# Patient Record
Sex: MALE | Employment: FULL TIME | URBAN - METROPOLITAN AREA
[De-identification: names, ages, dates, MRNs, and addresses within clinical notes are randomized per-mention and may not be internally consistent; named-entity substitution may affect disease eponyms.]

---

## 2018-01-24 ENCOUNTER — APPOINTMENT (OUTPATIENT)
Dept: GENERAL RADIOLOGY | Age: 23
End: 2018-01-24
Attending: PHYSICIAN ASSISTANT
Payer: COMMERCIAL

## 2018-01-24 ENCOUNTER — HOSPITAL ENCOUNTER (EMERGENCY)
Age: 23
Discharge: HOME | End: 2018-01-25
Attending: EMERGENCY MEDICINE
Payer: COMMERCIAL

## 2018-01-24 VITALS
OXYGEN SATURATION: 99 % | DIASTOLIC BLOOD PRESSURE: 81 MMHG | RESPIRATION RATE: 16 BRPM | WEIGHT: 124.12 LBS | HEIGHT: 65 IN | SYSTOLIC BLOOD PRESSURE: 134 MMHG | TEMPERATURE: 98.7 F | HEART RATE: 99 BPM | BODY MASS INDEX: 20.68 KG/M2

## 2018-01-24 DIAGNOSIS — S62.632A CLOSED DISPLACED FRACTURE OF DISTAL PHALANX OF RIGHT MIDDLE FINGER, INITIAL ENCOUNTER: ICD-10-CM

## 2018-01-24 DIAGNOSIS — S62.630A CLOSED DISPLACED FRACTURE OF DISTAL PHALANX OF RIGHT INDEX FINGER, INITIAL ENCOUNTER: Primary | ICD-10-CM

## 2018-01-24 PROCEDURE — 74011250637 HC RX REV CODE- 250/637: Performed by: PHYSICIAN ASSISTANT

## 2018-01-24 PROCEDURE — 99283 EMERGENCY DEPT VISIT LOW MDM: CPT

## 2018-01-24 PROCEDURE — 73130 X-RAY EXAM OF HAND: CPT

## 2018-01-24 RX ORDER — NAPROXEN 500 MG/1
500 TABLET ORAL
Qty: 20 TAB | Refills: 0 | Status: SHIPPED | OUTPATIENT
Start: 2018-01-24 | End: 2018-01-25

## 2018-01-24 RX ORDER — HYDROCODONE BITARTRATE AND ACETAMINOPHEN 5; 325 MG/1; MG/1
1 TABLET ORAL
Qty: 20 TAB | Refills: 0 | Status: SHIPPED | OUTPATIENT
Start: 2018-01-24 | End: 2020-01-24

## 2018-01-24 RX ORDER — HYDROCODONE BITARTRATE AND ACETAMINOPHEN 5; 325 MG/1; MG/1
1 TABLET ORAL
Status: COMPLETED | OUTPATIENT
Start: 2018-01-24 | End: 2018-01-24

## 2018-01-24 RX ORDER — NAPROXEN 250 MG/1
500 TABLET ORAL 2 TIMES DAILY WITH MEALS
Status: DISCONTINUED | OUTPATIENT
Start: 2018-01-25 | End: 2018-01-24

## 2018-01-24 RX ORDER — NAPROXEN 250 MG/1
500 TABLET ORAL
Status: COMPLETED | OUTPATIENT
Start: 2018-01-24 | End: 2018-01-24

## 2018-01-24 RX ADMIN — HYDROCODONE BITARTRATE AND ACETAMINOPHEN 1 TABLET: 5; 325 TABLET ORAL at 21:51

## 2018-01-24 RX ADMIN — NAPROXEN 500 MG: 250 TABLET ORAL at 21:50

## 2018-01-24 NOTE — LETTER
[address] 
[facility] EMERGENCY DEPT 
[address] [place] [address] [address] [id number] [phone] Work/School Note Date: 1/24/2018 - 1/25/2018 To Whom It May concern: 
 
Román Carroll was seen and treated today in the emergency room by the following provider(s): 
Attending Provider: Cherylene Beauvais, MD 
Physician Assistant: JAMIA Mendoza. [facility] may return to work on 1/28/18 or sooner, if feeling better. Sincerely, JAMIA Mendoza

## 2018-01-25 PROCEDURE — 77030008331

## 2018-01-25 RX ORDER — NAPROXEN 500 MG/1
500 TABLET ORAL
Qty: 20 TAB | Refills: 0 | Status: SHIPPED | OUTPATIENT
Start: 2018-01-25 | End: 2020-01-28

## 2018-01-25 NOTE — ED NOTES
Pt states \"smashing\" R hand on concrete well-states swelling, bruising and main mostly to 2nd and 3rd digits R hand.

## 2018-01-25 NOTE — DISCHARGE INSTRUCTIONS
Finger Fracture: Care Instructions  Your Care Instructions    Breaks in the bones of the finger usually heal well in about 3 to 4 weeks. The pain and swelling from a broken finger can last for weeks. But it should steadily improve, starting a few days after you break it. It is very important that you wear and take care of the cast or splint exactly as your doctor tells you to so that your finger heals properly and does not end up crooked. Wearing a splint may interfere with your normal activities. Ask for help with daily tasks if you need it. You heal best when you take good care of yourself. Eat a variety of healthy foods, and don't smoke. Follow-up care is a key part of your treatment and safety. Be sure to make and go to all appointments, and call your doctor if you are having problems. It's also a good idea to know your test results and keep a list of the medicines you take. How can you care for yourself at home? · If your doctor put a splint on your finger, wear the splint exactly as directed. Do not remove it until your doctor says that you can. · Keep your hand raised above the level of your heart as much as you can. This will help reduce swelling. · Put ice or a cold pack on your finger for 10 to 20 minutes at a time. Try to do this every 1 to 2 hours for the next 3 days (when you are awake) or until the swelling goes down. Put a thin cloth between the ice and your skin. Keep the splint dry. · Be safe with medicines. Take pain medicines exactly as directed. ¨ If the doctor gave you a prescription medicine for pain, take it as prescribed. ¨ If you are not taking a prescription pain medicine, ask your doctor if you can take an over-the-counter medicine. When should you call for help? Call 911 anytime you think you may need emergency care. For example, call if:  ? · Your finger is cool or pale or changes color.    ?Call your doctor now or seek immediate medical care if:  ? · Your pain gets much worse.   ? · You have tingling, weakness, or numbness in your finger. ? · You have signs of infection, such as:  ¨ Increased pain, swelling, warmth, or redness. ¨ Red streaks leading from the area. ¨ Pus draining from the area. ¨ Swollen lymph nodes in your neck, armpits, or groin. ¨ A fever. ? Watch closely for changes in your health, and be sure to contact your doctor if:  ? · Your finger is not steadily improving. Where can you learn more? Go to http://neeraj-enid.info/. Enter P761 in the search box to learn more about \"Finger Fracture: Care Instructions. \"  Current as of: March 21, 2017  Content Version: 11.4  © 7964-7709 [employer]. Care instructions adapted under license by [employer] (which disclaims liability or warranty for this information). If you have questions about a medical condition or this instruction, always ask your healthcare professional. [place] [address] any warranty or liability for your use of this information. Finger Fracture: Rehab Exercises  Your Care Instructions  Here are some examples of typical rehabilitation exercises for your condition. Start each exercise slowly. Ease off the exercise if you start to have pain. Your doctor or your physical or occupational therapist will tell you when you can start these exercises and which ones will work best for you. How to do the exercises  Finger extension    1. Place your hand flat on a table, palm down. 2. Lift and then lower your affected finger off the table. 3. Repeat 8 to 12 times. MP extension    1. Place your good hand on a table, palm up. Put your hand with the affected finger on top of your good hand with your fingers wrapped around the thumb of your good hand like you are making a fist.  2. Slowly uncurl the joints of your hand with the affected finger where your fingers connect to your hand so that only the top two joints of your fingers are bent. Your fingers will look like a hook. 3. Move back to your starting position, with your fingers wrapped around your good thumb. 4. Repeat 8 to 12 times. DIP flexion    1. With your good hand, grasp your affected finger. Your thumb will be on the top side of your finger just below the joint that is closest to your fingernail. 2. Slowly bend your affected finger only at the joint closest to your fingernail. Hold for about 6 seconds. 3. Repeat 8 to 12 times. PIP extension (with MP extension)    1. Place your good hand on a table, palm up. Put your hand with the affected finger on top of your good hand. 2. Use the thumb and fingers of your good hand to grasp below the middle joint of your affected finger. 3. Bend and then straighten the last two joints of your affected finger. 4. Repeat 8 to 12 times. Isolated PIP flexion    1. Place the hand with the affected finger flat on a table, palm up. With your other hand, press down on the fingers that are not affected. Your affected finger will be free to move. 2. Slowly bend your affected finger. Hold for about 6 seconds. Then straighten your finger. 3. Repeat 8 to 12 times. Imaginary ball squeeze    1. Pretend to hold an imaginary ball. 2. Slowly bend your fingers around the imaginary ball, and squeeze the \"ball\" for about 6 seconds. Then slowly straighten your fingers to release the \"ball. \"  3. Repeat 8 to 12 times. Tendon glides    1. In this exercise, the steps follow one another to a make a continuous movement. 2. Hold your hand upward. Your fingers and thumb will be pointing straight up. Your wrist should be relaxed, following the line of your fingers and thumb. 3. Curl your fingers so that the top two joints in them are bent, and your fingers wrap down. Your fingertips should touch or be near the base of your fingers. Your fingers will look like a hook. 4. Make a fist by bending your knuckles.  Your thumb can gently rest against your index (pointing) finger. 5. Unwind your fingers slightly so that your fingertips can touch the base of your palm. Your thumb can rest against your index finger. 6. Move back to your starting position, with your fingers and thumb pointing up. 7. Repeat the series of motions 8 to 12 times. Towel squeeze    1. Place a small towel roll on a table. 2. With your palm facing down, grab the towel and squeeze it for about 6 seconds. Then slowly straighten your fingers to release the towel. 3. Repeat 8 to 12 times. Towel grab    1. Fold a small towel in half, and lay it flat on a table. 2. Put your hand flat on the towel, palm down. Grab the towel, and scrunch it toward you until your hand is in a fist.  3. Slowly straighten your fingers to push the towel back so it is flat on the table again. 4. Repeat 8 to 12 times. Follow-up care is a key part of your treatment and safety. Be sure to make and go to all appointments, and call your doctor if you are having problems. It's also a good idea to know your test results and keep a list of the medicines you take. Where can you learn more? Go to http://neeraj-enid.info/. Enter V403 in the search box to learn more about \"Finger Fracture: Rehab Exercises. \"  Current as of: March 21, 2017  Content Version: 11.4  © 2807-3768 [employer], [employer]. Care instructions adapted under license by [employer] (which disclaims liability or warranty for this information). If you have questions about a medical condition or this instruction, always ask your healthcare professional. [address] any warranty or liability for your use of this information.

## 2018-01-25 NOTE — ED NOTES
Discharge instructions given to patient by PA Car. Verbalized understanding of instructions. Patient discharged without difficulty. Patient discharged in stable condition via ambulation accompanied by spouse.

## 2018-01-25 NOTE — ED PROVIDER NOTES
EMERGENCY DEPARTMENT HISTORY AND PHYSICAL EXAM      Date: 1/24/2018  Patient Name: Garrett Shepard Page    History of Presenting Illness     Chief Complaint   Patient presents with    Hand Pain     Ambulatory to triage. A concrete brick close to 100 lbs fell on the tips of his fingers on the right hand. This happened about an hour ago. History Provided By: Patient    HPI: Moisés Gibson is a 20 y.o. R handed male who presents ambulatory to the ED c/o sudden onset 8/10 R hand pain s/p crush injury x 2100 this evening. Pt states his hand was caught under an ~100lb concrete brick. He reports additional swelling and bruising to his R 2nd and 3rd distal finger tips. Pt states he hasn't been evaluated by an orthopedic surgeon in years. He denies any recent medications for his current sxs. Pt denies any known hx of long standing diseases or daily medications. He otherwise specifically denies any recent fevers, chills, nausea, vomiting, diarrhea, abd pain, CP, SOB, urinary sxs, changes in BM, or headache. PCP: None    Allergies: NKDA  Social Hx: -tobacco, -EtOH, -Illicit Drugs    There are no other complaints, changes, or physical findings at this time. Past History     Past Medical History:  History reviewed. No pertinent past medical history. Past Surgical History:  History reviewed. No pertinent surgical history. Family History:  History reviewed. No pertinent family history. Social History:  Social History   Substance Use Topics    Smoking status: Never Smoker    Smokeless tobacco: Never Used    Alcohol use None       Allergies:  No Known Allergies      Review of Systems   Review of Systems   Constitutional: Negative. Negative for fever. HENT: Negative. Eyes: Negative. Respiratory: Negative. Cardiovascular: Negative. Gastrointestinal: Negative. Negative for constipation, diarrhea, nausea and vomiting.         Denies liver disease   Endocrine:        Denies thyroid disease Genitourinary: Negative. Negative for dysuria. Denies kidney disease   Musculoskeletal: Positive for arthralgias (R hand 2nd and 3rd fingers). Skin: Negative. Neurological: Negative. All other systems reviewed and are negative. Physical Exam   Physical Exam   Constitutional: He is oriented to person, place, and time. He appears well-developed and well-nourished. No distress. HENT:   Head: Normocephalic and atraumatic. Right Ear: External ear normal.   Left Ear: External ear normal.   Nose: Nose normal.   Eyes: Conjunctivae and EOM are normal. Pupils are equal, round, and reactive to light. Right eye exhibits no discharge. Left eye exhibits no discharge. No scleral icterus. Neck: Normal range of motion. Neck supple. No tracheal deviation present. Cardiovascular: Normal rate, regular rhythm, normal heart sounds and intact distal pulses. Exam reveals no gallop and no friction rub. No murmur heard. Pulmonary/Chest: Effort normal and breath sounds normal. No respiratory distress. He has no wheezes. He has no rales. He exhibits no tenderness. Musculoskeletal: He exhibits no edema or tenderness. Contusion to the du aspect distal phalanx of the R 2nd and 3rd fingers  Small subungual hematomas in the nail beds of both fingers  Flexion extension in tact  Sensations intact  Capillary refill less than 3 seconds   Lymphadenopathy:     He has no cervical adenopathy. Neurological: He is alert and oriented to person, place, and time. No cranial nerve deficit. Coordination normal.   Skin: Skin is warm and dry. No rash noted. No erythema. Psychiatric: He has a normal mood and affect. His behavior is normal. Judgment and thought content normal.   Nursing note and vitals reviewed. Diagnostic Study Results       Radiologic Studies -     XR HAND RT MIN 3 V   Final Result     EXAM:  XR HAND RT MIN 3 V     INDICATION:  injury.   Crush injury to right hand.     COMPARISON: None.     FINDINGS: Three views of the right hand demonstrate mildly displaced fractures  involving the ro of the second and third distal phalanges. No other acute  fracture or dislocation is evident. The soft tissues are within normal limits.     IMPRESSION  IMPRESSION:  Mildly displaced second and third distal phalanx fractures. Medical Decision Making   I am the first provider for this patient. I reviewed the vital signs, available nursing notes, past medical history, past surgical history, family history and social history. Vital Signs-Reviewed the patient's vital signs. Patient Vitals for the past 12 hrs:   Temp Pulse Resp BP SpO2   01/24/18 2152 98.7 °F (37.1 °C) 99 16 134/81 99 %       Records Reviewed: Nursing Notes and Old Medical Records    Provider Notes (Medical Decision Making):     DDx: sprain, strain, fracture, contusion, crush injury, subungual hematoma    ED Course:   Initial assessment performed. The patients presenting problems have been discussed, and they are in agreement with the care plan formulated and outlined with them. I have encouraged them to ask questions as they arise throughout their visit. Procedure Note - Splint Placement:  11:51 PM  Performed by: Warnell Duverney  Neurovascularly intact prior to tx. A plastic STAXX finger splint was placed on pt's right 2nd and 3rd fingers. Joint was placed in neutral position. Neurovascularly intact after tx. The procedure took 1-15 minutes, and pt tolerated well. Progress note:  12:01 AM  Pt noted to be feeling better following Norco and Naproxen, ready for discharge. Updated pt and/or family on all final imaging findings. Will follow up as instructed. All questions have been answered, pt voiced understanding and agreement with plan. Narcotics were prescribed, pt was advised not to drive or operate heavy machinery.  Specific return precautions provided as well as instructions to return to the ED should sx worsen at any time. Vital signs stable for discharge. Written by Kareen Gunderson ED Scribe, as dictated by Alanna Quarles    Disposition:    DISCHARGE NOTE:  12:01 AM  The patient's results have been reviewed with family and/or caregiver. They verbally convey their understanding and agreement of the patient's signs, symptoms, diagnosis, treatment, and prognosis. They additionally agree to follow up as recommended in the discharge instructions or to return to the Emergency Room should the patient's condition change prior to their follow-up appointment. The family and/or caregiver verbally agrees with the care-plan and all of their questions have been answered. The discharge instructions have also been provided to the them along with educational information regarding the patient's diagnosis and a list of reasons why the patient would want to return to the ER prior to their follow-up appointment should their condition change. Written by CASSIE Lovelaceibe, as dictated by Alanna Quarles. PLAN:  1. Current Discharge Medication List      START taking these medications    Details   HYDROcodone-acetaminophen (NORCO) 5-325 mg per tablet Take 1 Tab by mouth every six (6) hours as needed for Pain. Max Daily Amount: 4 Tabs. Qty: 20 Tab, Refills: 0    Associated Diagnoses: Closed displaced fracture of distal phalanx of right index finger, initial encounter; Closed displaced fracture of distal phalanx of right middle finger, initial encounter      naproxen (NAPROSYN) 500 mg tablet Take 1 Tab by mouth every twelve (12) hours as needed for Pain. Qty: 20 Tab, Refills: 0           2.    Follow-up Information     Follow up With Details Comments 1227 Den Yoon MD  hand surgeon Xander [address]  [facility]  [phone]      [facility] EMERGENCY DEPT  If symptoms worsen [address]  [address] [facility]  [phone]        Return to ED if worse     Diagnosis Clinical Impression:   1. Closed displaced fracture of distal phalanx of right index finger, initial encounter    2. Closed displaced fracture of distal phalanx of right middle finger, initial encounter        Attestations: This note is prepared by Federica Mcgee, acting as Scribe for ANGEL Lay Favors: The scribe's documentation has been prepared under my direction and personally reviewed by me in its entirety. I confirm that the note above accurately reflects all work, treatment, procedures, and medical decision making performed by me. This note will not be viewable in [address]

## 2020-01-22 ENCOUNTER — HOSPITAL ENCOUNTER (EMERGENCY)
Age: 25
Discharge: HOME | End: 2020-01-22
Attending: EMERGENCY MEDICINE
Payer: COMMERCIAL

## 2020-01-22 ENCOUNTER — APPOINTMENT (OUTPATIENT)
Dept: CT IMAGING | Age: 25
End: 2020-01-22
Attending: EMERGENCY MEDICINE
Payer: COMMERCIAL

## 2020-01-22 VITALS
HEIGHT: 70 IN | OXYGEN SATURATION: 96 % | HEART RATE: 108 BPM | WEIGHT: 156.53 LBS | SYSTOLIC BLOOD PRESSURE: 112 MMHG | DIASTOLIC BLOOD PRESSURE: 61 MMHG | RESPIRATION RATE: 18 BRPM | BODY MASS INDEX: 22.41 KG/M2 | TEMPERATURE: 99.9 F

## 2020-01-22 DIAGNOSIS — R50.9 FEVER, UNSPECIFIED FEVER CAUSE: ICD-10-CM

## 2020-01-22 DIAGNOSIS — R51.9 NONINTRACTABLE HEADACHE, UNSPECIFIED CHRONICITY PATTERN, UNSPECIFIED HEADACHE TYPE: Primary | ICD-10-CM

## 2020-01-22 LAB
ALBUMIN SERPL-MCNC: 3.8 G/DL (ref 3.5–5)
ALBUMIN/GLOB SERPL: 0.8 {RATIO} (ref 1.1–2.2)
ALP SERPL-CCNC: 80 U/L (ref 45–117)
ALT SERPL-CCNC: 44 U/L (ref 12–78)
ANION GAP SERPL CALC-SCNC: 7 MMOL/L (ref 5–15)
APPEARANCE CSF: CLEAR
AST SERPL-CCNC: 29 U/L (ref 15–37)
BASOPHILS # BLD: 0.1 K/UL (ref 0–0.1)
BASOPHILS NFR BLD: 1 % (ref 0–1)
BILIRUB SERPL-MCNC: 0.4 MG/DL (ref 0.2–1)
BUN SERPL-MCNC: 16 MG/DL (ref 6–20)
BUN/CREAT SERPL: 15 (ref 12–20)
CALCIUM SERPL-MCNC: 8.9 MG/DL (ref 8.5–10.1)
CHLORIDE SERPL-SCNC: 102 MMOL/L (ref 97–108)
CO2 SERPL-SCNC: 26 MMOL/L (ref 21–32)
COLOR CSF: COLORLESS
CREAT SERPL-MCNC: 1.07 MG/DL (ref 0.7–1.3)
DIFFERENTIAL METHOD BLD: NORMAL
EOSINOPHIL # BLD: 0.2 K/UL (ref 0–0.4)
EOSINOPHIL NFR BLD: 2 % (ref 0–7)
ERYTHROCYTE [DISTWIDTH] IN BLOOD BY AUTOMATED COUNT: 12.5 % (ref 11.5–14.5)
GLOBULIN SER CALC-MCNC: 4.6 G/DL (ref 2–4)
GLUCOSE CSF-MCNC: 48 MG/DL (ref 40–70)
GLUCOSE SERPL-MCNC: 84 MG/DL (ref 65–100)
HCT VFR BLD AUTO: 43.6 % (ref 36.6–50.3)
HGB BLD-MCNC: 15.3 G/DL (ref 12.1–17)
IMM GRANULOCYTES # BLD AUTO: 0 K/UL (ref 0–0.04)
IMM GRANULOCYTES NFR BLD AUTO: 0 % (ref 0–0.5)
LYMPHOCYTES # BLD: 3.1 K/UL (ref 0.8–3.5)
LYMPHOCYTES NFR BLD: 31 % (ref 12–49)
MCH RBC QN AUTO: 28.9 PG (ref 26–34)
MCHC RBC AUTO-ENTMCNC: 35.1 G/DL (ref 30–36.5)
MCV RBC AUTO: 82.3 FL (ref 80–99)
MONOCYTES # BLD: 0.8 K/UL (ref 0–1)
MONOCYTES NFR BLD: 8 % (ref 5–13)
NEUTS SEG # BLD: 6 K/UL (ref 1.8–8)
NEUTS SEG NFR BLD: 58 % (ref 32–75)
NRBC # BLD: 0 K/UL (ref 0–0.01)
NRBC BLD-RTO: 0 PER 100 WBC
PLATELET # BLD AUTO: 193 K/UL (ref 150–400)
PMV BLD AUTO: 9.8 FL (ref 8.9–12.9)
POTASSIUM SERPL-SCNC: 4 MMOL/L (ref 3.5–5.1)
PROT CSF-MCNC: 37 MG/DL (ref 15–45)
PROT SERPL-MCNC: 8.4 G/DL (ref 6.4–8.2)
RBC # BLD AUTO: 5.3 M/UL (ref 4.1–5.7)
RBC # CSF: 135 /CU MM
SODIUM SERPL-SCNC: 135 MMOL/L (ref 136–145)
TUBE # CSF: 1
TUBE # CSF: 1
TUBE # CSF: 3
WBC # BLD AUTO: 10.2 K/UL (ref 4.1–11.1)
WBC # CSF: 0 /CU MM (ref 0–5)

## 2020-01-22 PROCEDURE — 75810000133 HC LUMBAR PUNCTURE

## 2020-01-22 PROCEDURE — 70450 CT HEAD/BRAIN W/O DYE: CPT

## 2020-01-22 PROCEDURE — 89050 BODY FLUID CELL COUNT: CPT

## 2020-01-22 PROCEDURE — 80053 COMPREHEN METABOLIC PANEL: CPT

## 2020-01-22 PROCEDURE — 99283 EMERGENCY DEPT VISIT LOW MDM: CPT

## 2020-01-22 PROCEDURE — 96375 TX/PRO/DX INJ NEW DRUG ADDON: CPT

## 2020-01-22 PROCEDURE — 74011000250 HC RX REV CODE- 250: Performed by: EMERGENCY MEDICINE

## 2020-01-22 PROCEDURE — 74011250637 HC RX REV CODE- 250/637: Performed by: EMERGENCY MEDICINE

## 2020-01-22 PROCEDURE — 74011250636 HC RX REV CODE- 250/636: Performed by: EMERGENCY MEDICINE

## 2020-01-22 PROCEDURE — 85025 COMPLETE CBC W/AUTO DIFF WBC: CPT

## 2020-01-22 PROCEDURE — 36415 COLL VENOUS BLD VENIPUNCTURE: CPT

## 2020-01-22 PROCEDURE — 87205 SMEAR GRAM STAIN: CPT

## 2020-01-22 PROCEDURE — 82945 GLUCOSE OTHER FLUID: CPT

## 2020-01-22 PROCEDURE — 84157 ASSAY OF PROTEIN OTHER: CPT

## 2020-01-22 PROCEDURE — 96374 THER/PROPH/DIAG INJ IV PUSH: CPT

## 2020-01-22 PROCEDURE — 87015 SPECIMEN INFECT AGNT CONCNTJ: CPT

## 2020-01-22 RX ORDER — DIPHENHYDRAMINE HYDROCHLORIDE 50 MG/ML
25 INJECTION, SOLUTION INTRAMUSCULAR; INTRAVENOUS
Status: COMPLETED | OUTPATIENT
Start: 2020-01-22 | End: 2020-01-22

## 2020-01-22 RX ORDER — ACETAMINOPHEN 500 MG
1000 TABLET ORAL ONCE
Status: COMPLETED | OUTPATIENT
Start: 2020-01-22 | End: 2020-01-22

## 2020-01-22 RX ADMIN — PROCHLORPERAZINE EDISYLATE 10 MG: 5 INJECTION INTRAMUSCULAR; INTRAVENOUS at 15:42

## 2020-01-22 RX ADMIN — DIPHENHYDRAMINE HYDROCHLORIDE 25 MG: 50 INJECTION, SOLUTION INTRAMUSCULAR; INTRAVENOUS at 15:43

## 2020-01-22 RX ADMIN — SODIUM CHLORIDE 1000 ML: 900 INJECTION, SOLUTION INTRAVENOUS at 15:40

## 2020-01-22 RX ADMIN — ACETAMINOPHEN 1000 MG: 500 TABLET ORAL at 15:32

## 2020-01-22 NOTE — ED PROVIDER NOTES
EMERGENCY DEPARTMENT HISTORY AND PHYSICAL EXAM      Date: 1/22/2020  Patient Name: Mary Jo Prieto  Patient Age and Sex: 21 y.o. male     History of Presenting Illness     Chief Complaint   Patient presents with    Headache     onset 1/19 with fever; sent by [facility] for r/o meningitis. - flu and unremarkable labs. Appears non toxic       History Provided By: Patient     HPI: Bhargav Mills Is a 76-year-old male with past medical history of Crohn's disease on Humira presenting today with fever and headache. The patient reports around 3 days ago he started having severe headache. He describes this as all over the back of his head without radiation. He had his headache somewhat subsided then he was woken up from sleep again with severe headache. The patient states that he had nausea, but no vomiting. He denies any other associated symptoms he has no cough, congestion, sore throat, abdominal pain, rash. He is not on steroids, but does take Humira. He was seen at urgent care and then was transitioned to the emergency department for further evaluation. Negative influenza test at urgent care. There are no other complaints, changes, or physical findings at this time. PCP: None    No current facility-administered medications on file prior to encounter. Current Outpatient Medications on File Prior to Encounter   Medication Sig Dispense Refill    naproxen (NAPROSYN) 500 mg tablet Take 1 Tab by mouth every twelve (12) hours as needed for Pain. 20 Tab 0    HYDROcodone-acetaminophen (NORCO) 5-325 mg per tablet Take 1 Tab by mouth every six (6) hours as needed for Pain. Max Daily Amount: 4 Tabs. 20 Tab 0       Past History     Past Medical History:  No past medical history on file. Past Surgical History:  No past surgical history on file. Family History:  No family history on file.     Social History:  Social History     Tobacco Use    Smoking status: Never Smoker    Smokeless tobacco: Never Used Substance Use Topics    Alcohol use: Not on file    Drug use: Not on file       Allergies:  No Known Allergies      Review of Systems   Constitutional: +  fever,  +  headache  Skin: No  rash, No  jaundice  HEENT: No  nasal congestion, No  eye drainage. Resp: No cough,  No  wheezing  CV: No chest pain, No  palpitations  GI: No vomiting,  No  diarrhea.,  No  constipation  : No dysuria,  No  hematuria  MSK: No joint pain,  No  trauma  Neuro: No numbness, No  tingling  Psych: No suicidal, No  paranoid      Physical Exam     Patient Vitals for the past 12 hrs:   Temp Pulse Resp BP SpO2   01/22/20 1800    112/61 96 %   01/22/20 1545    121/80    01/22/20 1350 99.9 °F (37.7 °C) (!) 108 18 (!) 148/95 100 %     General: alert, No acute distress  Eyes: EOMI, normal conjunctiva  ENT: moist mucous membranes. Neck: Active, full ROM of neck. No stiffness  Skin: No rashes. no jaundice              Lungs: Equal chest expansion. no respiratory distress. clear to auscultation bilaterally No accessory muscle usage  Heart: tachycardic with a  regular rhythm     no peripheral edema   2+ radial pulses and DPs bilaterally  Abd:  non distended soft, nontender. No rebound tenderness. No guarding  Back: Full ROM  MSK: Full, active ROM in all 4 extremities.    Neuro: Person, Place, Time and Situation; normal speech;   Psych: Cooperative with exam; Appropriate mood and affect             Diagnostic Study Results     Labs -     Recent Results (from the past 12 hour(s))   METABOLIC PANEL, COMPREHENSIVE    Collection Time: 01/22/20  3:49 PM   Result Value Ref Range    Sodium 135 (L) 136 - 145 mmol/L    Potassium 4.0 3.5 - 5.1 mmol/L    Chloride 102 97 - 108 mmol/L    CO2 26 21 - 32 mmol/L    Anion gap 7 5 - 15 mmol/L    Glucose 84 65 - 100 mg/dL    BUN 16 6 - 20 MG/DL    Creatinine 1.07 0.70 - 1.30 MG/DL    BUN/Creatinine ratio 15 12 - 20      GFR est AA >60 >60 ml/min/1.73m2    GFR est non-AA >60 >60 ml/min/1.73m2    Calcium 8.9 8.5 - 10.1 MG/DL    Bilirubin, total 0.4 0.2 - 1.0 MG/DL    ALT (SGPT) 44 12 - 78 U/L    AST (SGOT) 29 15 - 37 U/L    Alk. phosphatase 80 45 - 117 U/L    Protein, total 8.4 (H) 6.4 - 8.2 g/dL    Albumin 3.8 3.5 - 5.0 g/dL    Globulin 4.6 (H) 2.0 - 4.0 g/dL    A-G Ratio 0.8 (L) 1.1 - 2.2     CBC WITH AUTOMATED DIFF    Collection Time: 01/22/20  3:49 PM   Result Value Ref Range    WBC 10.2 4.1 - 11.1 K/uL    RBC 5.30 4. 10 - 5.70 M/uL    HGB 15.3 12.1 - 17.0 g/dL    HCT 43.6 36.6 - 50.3 %    MCV 82.3 80.0 - 99.0 FL    MCH 28.9 26.0 - 34.0 PG    MCHC 35.1 30.0 - 36.5 g/dL    RDW 12.5 11.5 - 14.5 %    PLATELET 947 840 - 344 K/uL    MPV 9.8 8.9 - 12.9 FL    NRBC 0.0 0  WBC    ABSOLUTE NRBC 0.00 0.00 - 0.01 K/uL    NEUTROPHILS 58 32 - 75 %    LYMPHOCYTES 31 12 - 49 %    MONOCYTES 8 5 - 13 %    EOSINOPHILS 2 0 - 7 %    BASOPHILS 1 0 - 1 %    IMMATURE GRANULOCYTES 0 0.0 - 0.5 %    ABS. NEUTROPHILS 6.0 1.8 - 8.0 K/UL    ABS. LYMPHOCYTES 3.1 0.8 - 3.5 K/UL    ABS. MONOCYTES 0.8 0.0 - 1.0 K/UL    ABS. EOSINOPHILS 0.2 0.0 - 0.4 K/UL    ABS. BASOPHILS 0.1 0.0 - 0.1 K/UL    ABS. IMM. GRANS. 0.0 0.00 - 0.04 K/UL    DF AUTOMATED     PROTEIN, CSF    Collection Time: 01/22/20  5:30 PM   Result Value Ref Range    Tube No. 1      Protein,CSF 37 15 - 45 MG/DL   GLUCOSE, CSF    Collection Time: 01/22/20  5:30 PM   Result Value Ref Range    Tube No. 1      Glucose,CSF 48 40 - 70 MG/DL   CULTURE, CSF W GRAM STAIN    Collection Time: 01/22/20  5:30 PM   Result Value Ref Range    Special Requests: NO SPECIAL REQUESTS      GRAM STAIN RARE WBCS SEEN      GRAM STAIN NO ORGANISMS SEEN      Culture result: PENDING    CELL COUNT, CSF    Collection Time: 01/22/20  5:30 PM   Result Value Ref Range    CSF TUBE NO. 3      CSF COLOR COLORLESS COL      CSF APPEARANCE CLEAR CLEAR      CSF RBCS 135 (H) 0 /cu mm    CSF WBCS 0 0 - 5 /cu mm       Radiologic Studies -   CT HEAD WO CONT   Final Result   IMPRESSION: No acute intracranial process. CT Results  (Last 48 hours)               01/22/20 1523  CT HEAD WO CONT Final result    Impression:  IMPRESSION: No acute intracranial process. Narrative:  EXAM: CT HEAD WO CONT       INDICATION: severe headache       COMPARISON: None. CONTRAST: None. TECHNIQUE: Unenhanced CT of the head was performed using 5 mm images. Brain and   bone windows were generated. CT dose reduction was achieved through use of a   standardized protocol tailored for this examination and automatic exposure   control for dose modulation. FINDINGS:   The ventricles and sulci are normal in size, shape and configuration and   midline. There is no significant white matter disease. There is no intracranial   hemorrhage, extra-axial collection, mass, mass effect or midline shift. The   basilar cisterns are open. No acute infarct is identified. The bone windows   demonstrate no abnormalities. The visualized portions of the paranasal sinuses   and mastoid air cells are clear. CXR Results  (Last 48 hours)    None            Medical Decision Making     Differential Diagnosis: Viral meningitis, viral URI, tension headache, dehydration    I reviewed the vital signs, available nursing notes, past medical history, past surgical history, family history and social history and old medical records. On my interpretation, Laboratory workup is significant for sodium is 135, otherwise unremarkable electrolytes, no elevation white blood cell count, CSF has normal protein, normal glucose, slightly elevated red blood cell counts in the last 2, but type was slightly traumatic as your white blood cells in the CSF  On my interpretation of the radiology studies CT head is negative for acute intracranial process    Management/ED course: Patient presented with headache and fever. He is on Humira for Crohn's disease. He had no other evidence of infection, and no symptoms to suggest alternate viral illness.   I performed a lumbar puncture. The only abnormality was some red blood cells in the CSF, but the tap was slightly traumatic. No xanthochromia. Patient had a negative CT scan. No white blood cells in the CSF. Nonetheless this has been sent for Gram stain and culture no organisms were seen and cultures pending. The patient had improvement in his headache after a migraine cocktail. And the patient is subsequently discharged follow-up with primary care provider. ED Course:   Initial assessment performed. The patients presenting problems have been discussed, and they are in agreement with the care plan formulated and outlined with them. I have encouraged them to ask questions as they arise throughout their visit. Procedures:  PROCEDURE:  Lumbar Puncture    Performed by: Skuhi Mares MD    Indication: Headache  Universal Protocol: a time out was performed and the correct patient was verified  Consent: Written consent obtained    The patient was placed in lateral recumbent position and was prepped and draped in proper sterile fashion. The spinous process interspace at the level of the superior iliac spine was anesthetized with 5 ml of 1% lidocaine. A 18 gauge spinal needle was used to obtain 20  milliliters of fluid. The opening pressure was 20. The fluid appeared clear and was sent to the lab for appropriate studies. The patient tolerated the procedure well without difficulty or complication. Post-Procedure Diagnosis: Same as indication  Complications: None  Estimated Blood Loss: Minimal  Specimens Removed: As documented above  Prosthetic devices/implants: No  Assistant(s): None        Dispo: Discharged. The patient has been re-evaluated and is ready for discharge. Reviewed available results with patient. Counseled patient on diagnosis and care plan. Patient has expressed understanding, and all questions have been answered.  Patient agrees with plan and agrees to follow up as recommended, or to return to the ED if their symptoms worsen. Discharge instructions have been provided and explained to the patient, along with reasons to return to the ED. PLAN:  Discharge Medication List as of 1/22/2020  6:39 PM        2. Follow-up Information    None       3. Return to ED if worse         Diagnosis     Clinical Impression:   1. Nonintractable headache, unspecified chronicity pattern, unspecified headache type    2. Fever, unspecified fever cause        Attestations:  Santos Meyers MD        Please note that this dictation was completed with [employer], the computer voice recognition software. Quite often unanticipated grammatical, syntax, homophones, and other interpretive errors are inadvertently transcribed by the computer software. Please disregard these errors. Please excuse any errors that have escaped final proofreading. Thank you.

## 2020-01-24 ENCOUNTER — APPOINTMENT (OUTPATIENT)
Dept: GENERAL RADIOLOGY | Age: 25
End: 2020-01-24
Attending: EMERGENCY MEDICINE
Payer: COMMERCIAL

## 2020-01-24 ENCOUNTER — HOSPITAL ENCOUNTER (EMERGENCY)
Age: 25
Discharge: HOME | End: 2020-01-25
Attending: EMERGENCY MEDICINE
Payer: COMMERCIAL

## 2020-01-24 ENCOUNTER — APPOINTMENT (OUTPATIENT)
Dept: CT IMAGING | Age: 25
End: 2020-01-24
Attending: EMERGENCY MEDICINE
Payer: COMMERCIAL

## 2020-01-24 DIAGNOSIS — R50.9 FEVER, UNSPECIFIED FEVER CAUSE: ICD-10-CM

## 2020-01-24 DIAGNOSIS — R10.13 ABDOMINAL PAIN, EPIGASTRIC: ICD-10-CM

## 2020-01-24 DIAGNOSIS — R11.2 NON-INTRACTABLE VOMITING WITH NAUSEA, UNSPECIFIED VOMITING TYPE: ICD-10-CM

## 2020-01-24 DIAGNOSIS — K52.9 ENTEROCOLITIS: Primary | ICD-10-CM

## 2020-01-24 LAB
ALBUMIN SERPL-MCNC: 3.3 G/DL (ref 3.5–5)
ALBUMIN/GLOB SERPL: 0.7 {RATIO} (ref 1.1–2.2)
ALP SERPL-CCNC: 70 U/L (ref 45–117)
ALT SERPL-CCNC: 34 U/L (ref 12–78)
ANION GAP SERPL CALC-SCNC: 5 MMOL/L (ref 5–15)
APPEARANCE UR: CLEAR
AST SERPL-CCNC: 19 U/L (ref 15–37)
BACTERIA URNS QL MICRO: NEGATIVE /HPF
BASOPHILS # BLD: 0.1 K/UL (ref 0–0.1)
BASOPHILS NFR BLD: 1 % (ref 0–1)
BILIRUB SERPL-MCNC: 0.4 MG/DL (ref 0.2–1)
BILIRUB UR QL: NEGATIVE
BUN SERPL-MCNC: 16 MG/DL (ref 6–20)
BUN/CREAT SERPL: 16 (ref 12–20)
CALCIUM SERPL-MCNC: 8.7 MG/DL (ref 8.5–10.1)
CHLORIDE SERPL-SCNC: 104 MMOL/L (ref 97–108)
CO2 SERPL-SCNC: 29 MMOL/L (ref 21–32)
COLOR UR: ABNORMAL
CREAT SERPL-MCNC: 1.02 MG/DL (ref 0.7–1.3)
DIFFERENTIAL METHOD BLD: ABNORMAL
EOSINOPHIL # BLD: 0.3 K/UL (ref 0–0.4)
EOSINOPHIL NFR BLD: 2 % (ref 0–7)
EPITH CASTS URNS QL MICRO: ABNORMAL /LPF
ERYTHROCYTE [DISTWIDTH] IN BLOOD BY AUTOMATED COUNT: 12.2 % (ref 11.5–14.5)
GLOBULIN SER CALC-MCNC: 4.7 G/DL (ref 2–4)
GLUCOSE SERPL-MCNC: 118 MG/DL (ref 65–100)
GLUCOSE UR STRIP.AUTO-MCNC: NEGATIVE MG/DL
HCT VFR BLD AUTO: 43.4 % (ref 36.6–50.3)
HGB BLD-MCNC: 14.9 G/DL (ref 12.1–17)
HGB UR QL STRIP: ABNORMAL
HYALINE CASTS URNS QL MICRO: ABNORMAL /LPF (ref 0–5)
IMM GRANULOCYTES # BLD AUTO: 0 K/UL (ref 0–0.04)
IMM GRANULOCYTES NFR BLD AUTO: 0 % (ref 0–0.5)
KETONES UR QL STRIP.AUTO: NEGATIVE MG/DL
LEUKOCYTE ESTERASE UR QL STRIP.AUTO: NEGATIVE
LYMPHOCYTES # BLD: 3.7 K/UL (ref 0.8–3.5)
LYMPHOCYTES NFR BLD: 30 % (ref 12–49)
MCH RBC QN AUTO: 28.6 PG (ref 26–34)
MCHC RBC AUTO-ENTMCNC: 34.3 G/DL (ref 30–36.5)
MCV RBC AUTO: 83.3 FL (ref 80–99)
MONOCYTES # BLD: 1.4 K/UL (ref 0–1)
MONOCYTES NFR BLD: 11 % (ref 5–13)
NEUTS SEG # BLD: 6.9 K/UL (ref 1.8–8)
NEUTS SEG NFR BLD: 56 % (ref 32–75)
NITRITE UR QL STRIP.AUTO: NEGATIVE
NRBC # BLD: 0 K/UL (ref 0–0.01)
NRBC BLD-RTO: 0 PER 100 WBC
PH UR STRIP: 6 [PH] (ref 5–8)
PLATELET # BLD AUTO: 200 K/UL (ref 150–400)
PMV BLD AUTO: 9.4 FL (ref 8.9–12.9)
POTASSIUM SERPL-SCNC: 3.8 MMOL/L (ref 3.5–5.1)
PROT SERPL-MCNC: 8 G/DL (ref 6.4–8.2)
PROT UR STRIP-MCNC: NEGATIVE MG/DL
RBC # BLD AUTO: 5.21 M/UL (ref 4.1–5.7)
RBC #/AREA URNS HPF: ABNORMAL /HPF (ref 0–5)
SODIUM SERPL-SCNC: 138 MMOL/L (ref 136–145)
SP GR UR REFRACTOMETRY: 1.03 (ref 1–1.03)
UA: UC IF INDICATED,UAUC: ABNORMAL
UROBILINOGEN UR QL STRIP.AUTO: 1 EU/DL (ref 0.2–1)
WBC # BLD AUTO: 12.3 K/UL (ref 4.1–11.1)
WBC URNS QL MICRO: ABNORMAL /HPF (ref 0–4)

## 2020-01-24 PROCEDURE — 74177 CT ABD & PELVIS W/CONTRAST: CPT

## 2020-01-24 PROCEDURE — 83690 ASSAY OF LIPASE: CPT

## 2020-01-24 PROCEDURE — 71046 X-RAY EXAM CHEST 2 VIEWS: CPT

## 2020-01-24 PROCEDURE — 80053 COMPREHEN METABOLIC PANEL: CPT

## 2020-01-24 PROCEDURE — 36415 COLL VENOUS BLD VENIPUNCTURE: CPT

## 2020-01-24 PROCEDURE — 81001 URINALYSIS AUTO W/SCOPE: CPT

## 2020-01-24 PROCEDURE — 99284 EMERGENCY DEPT VISIT MOD MDM: CPT

## 2020-01-24 PROCEDURE — 85025 COMPLETE CBC W/AUTO DIFF WBC: CPT

## 2020-01-24 RX ORDER — ACETAMINOPHEN 500 MG
1000 TABLET ORAL ONCE
Status: COMPLETED | OUTPATIENT
Start: 2020-01-24 | End: 2020-01-25

## 2020-01-24 RX ORDER — MORPHINE SULFATE 2 MG/ML
4 INJECTION, SOLUTION INTRAMUSCULAR; INTRAVENOUS ONCE
Status: COMPLETED | OUTPATIENT
Start: 2020-01-24 | End: 2020-01-25

## 2020-01-24 RX ORDER — ONDANSETRON 2 MG/ML
4 INJECTION INTRAMUSCULAR; INTRAVENOUS
Status: COMPLETED | OUTPATIENT
Start: 2020-01-24 | End: 2020-01-25

## 2020-01-24 RX ORDER — SODIUM CHLORIDE 0.9 % (FLUSH) 0.9 %
10 SYRINGE (ML) INJECTION
Status: COMPLETED | OUTPATIENT
Start: 2020-01-24 | End: 2020-01-25

## 2020-01-25 VITALS
RESPIRATION RATE: 17 BRPM | WEIGHT: 153 LBS | BODY MASS INDEX: 21.9 KG/M2 | DIASTOLIC BLOOD PRESSURE: 70 MMHG | HEIGHT: 70 IN | TEMPERATURE: 98.3 F | HEART RATE: 89 BPM | OXYGEN SATURATION: 98 % | SYSTOLIC BLOOD PRESSURE: 110 MMHG

## 2020-01-25 LAB
LACTATE SERPL-SCNC: 1 MMOL/L (ref 0.4–2)
LIPASE SERPL-CCNC: 176 U/L (ref 73–393)

## 2020-01-25 PROCEDURE — 83605 ASSAY OF LACTIC ACID: CPT

## 2020-01-25 PROCEDURE — 36415 COLL VENOUS BLD VENIPUNCTURE: CPT

## 2020-01-25 PROCEDURE — 96374 THER/PROPH/DIAG INJ IV PUSH: CPT

## 2020-01-25 PROCEDURE — 74011250636 HC RX REV CODE- 250/636: Performed by: EMERGENCY MEDICINE

## 2020-01-25 PROCEDURE — 96375 TX/PRO/DX INJ NEW DRUG ADDON: CPT

## 2020-01-25 PROCEDURE — 74011250637 HC RX REV CODE- 250/637: Performed by: EMERGENCY MEDICINE

## 2020-01-25 PROCEDURE — 74011636320 HC RX REV CODE- 636/320: Performed by: EMERGENCY MEDICINE

## 2020-01-25 RX ORDER — CIPROFLOXACIN 500 MG/1
500 TABLET ORAL 2 TIMES DAILY
Qty: 14 TAB | Refills: 0 | Status: SHIPPED | OUTPATIENT
Start: 2020-01-25 | End: 2020-01-30

## 2020-01-25 RX ORDER — METOCLOPRAMIDE HYDROCHLORIDE 5 MG/ML
10 INJECTION INTRAMUSCULAR; INTRAVENOUS
Status: COMPLETED | OUTPATIENT
Start: 2020-01-25 | End: 2020-01-25

## 2020-01-25 RX ORDER — METRONIDAZOLE 500 MG/1
500 TABLET ORAL 3 TIMES DAILY
Qty: 21 TAB | Refills: 0 | Status: SHIPPED | OUTPATIENT
Start: 2020-01-25 | End: 2020-01-30

## 2020-01-25 RX ORDER — METRONIDAZOLE 250 MG/1
500 TABLET ORAL ONCE
Status: COMPLETED | OUTPATIENT
Start: 2020-01-25 | End: 2020-01-25

## 2020-01-25 RX ORDER — DIPHENHYDRAMINE HYDROCHLORIDE 50 MG/ML
25 INJECTION, SOLUTION INTRAMUSCULAR; INTRAVENOUS
Status: COMPLETED | OUTPATIENT
Start: 2020-01-25 | End: 2020-01-25

## 2020-01-25 RX ORDER — CIPROFLOXACIN 500 MG/1
500 TABLET ORAL
Status: COMPLETED | OUTPATIENT
Start: 2020-01-25 | End: 2020-01-25

## 2020-01-25 RX ORDER — ONDANSETRON 4 MG/1
4 TABLET, ORALLY DISINTEGRATING ORAL
Qty: 20 TAB | Refills: 0 | Status: SHIPPED | OUTPATIENT
Start: 2020-01-25 | End: 2020-02-27

## 2020-01-25 RX ADMIN — ONDANSETRON 4 MG: 2 INJECTION INTRAMUSCULAR; INTRAVENOUS at 00:07

## 2020-01-25 RX ADMIN — CIPROFLOXACIN HYDROCHLORIDE 500 MG: 500 TABLET, FILM COATED ORAL at 02:53

## 2020-01-25 RX ADMIN — IOHEXOL 50 ML: 240 INJECTION, SOLUTION INTRATHECAL; INTRAVASCULAR; INTRAVENOUS; ORAL at 00:04

## 2020-01-25 RX ADMIN — METRONIDAZOLE 500 MG: 250 TABLET ORAL at 02:47

## 2020-01-25 RX ADMIN — METOCLOPRAMIDE 10 MG: 5 INJECTION, SOLUTION INTRAMUSCULAR; INTRAVENOUS at 02:40

## 2020-01-25 RX ADMIN — ACETAMINOPHEN 1000 MG: 500 TABLET ORAL at 00:08

## 2020-01-25 RX ADMIN — DIPHENHYDRAMINE HYDROCHLORIDE 25 MG: 50 INJECTION, SOLUTION INTRAMUSCULAR; INTRAVENOUS at 02:42

## 2020-01-25 RX ADMIN — SODIUM CHLORIDE 1000 ML: 900 INJECTION, SOLUTION INTRAVENOUS at 02:38

## 2020-01-25 RX ADMIN — MORPHINE SULFATE 4 MG: 2 INJECTION, SOLUTION INTRAMUSCULAR; INTRAVENOUS at 00:10

## 2020-01-25 RX ADMIN — Medication 10 ML: at 01:17

## 2020-01-25 RX ADMIN — IOPAMIDOL 100 ML: 755 INJECTION, SOLUTION INTRAVENOUS at 01:17

## 2020-01-25 RX ADMIN — SODIUM CHLORIDE 1000 ML: 900 INJECTION, SOLUTION INTRAVENOUS at 00:04

## 2020-01-25 NOTE — ED NOTES
Student nurse at bedside. Patient resting comfortably and continuing to drink his contrast for CT scan. No other needs at this time.

## 2020-01-25 NOTE — DISCHARGE INSTRUCTIONS
You were evaluated in the emergency department for abdominal pain, fever, nausea, and vomiting with headache. Your examination was reassuring as was your work-up including blood work and CT scan which demonstrate some enterocolitis. It will be important for you to follow-up with Dr. Shad Meier in 3-7 days. Please take antibiotics as prescribed until completion. If you develop worsening symptoms such as worsening fevers, abdominal pain, or inability to eat or drink, please return to the emergency department immediately.

## 2020-01-25 NOTE — ED PROVIDER NOTES
EMERGENCY DEPARTMENT HISTORY AND PHYSICAL EXAM      Date: 1/24/2020  Patient Name: Jacque Prieto    History of Presenting Illness     Chief Complaint   Patient presents with    Abdominal Pain    Vomiting       History Provided By: Patient, Patient's Father, Patient's Mother and Patient's Wife    HPI: Julia Bull, 21 y.o. male with history of Crohn's disease on Humira presents to the ED with cc of upper abdominal pain. Symptoms have been present starting this evening. Patient has had intermittent fevers over the past 4 days with T-max 104 °F at home. He complains of diffuse headache as well as persistent nausea and nonbloody nonbilious emesis, at least 10 episodes today. No change in stools. He has had very poor p.o. intake over the past 4 to 5 days. Does have an intermittent dry cough. The abdominal pain is described as moderate to severe, sharp stabbing pain worse when he takes a deep breath and located to epigastric and left upper quadrant. He is followed by GI, Dr. Mae Nelson, and diagnosed with Crohn's disease in May 2019. He was seen in the ED 1/23 with severe headache and fever and had lumbar puncture and CT head which was unremarkable. abd pain 930pm  Fevers 4 days, Tmax 104  Headaches  intermitent cough dry  Hurts to take a deep breath  N/V >10 NBNB  Poor PO intake  Crohns diagnosed May 2019, Dr. Mae carreon    There are no other complaints, changes, or physical findings at this time. PCP: None    No current facility-administered medications on file prior to encounter. Current Outpatient Medications on File Prior to Encounter   Medication Sig Dispense Refill    adalimumab (HUMIRA) 40 mg/0.8 mL injection by SubCUTAneous route once.  naproxen (NAPROSYN) 500 mg tablet Take 1 Tab by mouth every twelve (12) hours as needed for Pain.  20 Tab 0       Past History     Past Medical History:  Past Medical History:   Diagnosis Date    Gastrointestinal disorder        Past Surgical History:  History reviewed. No pertinent surgical history. Family History:  History reviewed. No pertinent family history. Social History:  Social History     Tobacco Use    Smoking status: Never Smoker    Smokeless tobacco: Never Used   Substance Use Topics    Alcohol use: Yes     Comment: occ    Drug use: Never       Allergies:  No Known Allergies      Review of Systems   Review of Systems   Constitutional: Positive for activity change, appetite change, chills, fatigue and fever. HENT: Negative. Eyes: Negative for visual disturbance. Respiratory: Positive for cough. Negative for shortness of breath. Cardiovascular: Negative for chest pain and leg swelling. Gastrointestinal: Positive for abdominal pain, nausea and vomiting. Negative for blood in stool, constipation and diarrhea. Genitourinary: Negative. Musculoskeletal: Negative for back pain and gait problem. Skin: Negative for color change and rash. Neurological: Positive for headaches. Negative for dizziness, weakness, light-headedness and numbness. Hematological: Does not bruise/bleed easily. All other systems reviewed and are negative. Physical Exam   Physical Exam  Vitals signs and nursing note reviewed. Constitutional:       General: He is not in acute distress. Appearance: Normal appearance. He is not ill-appearing or toxic-appearing. HENT:      Head: Normocephalic and atraumatic. Nose: Nose normal.      Mouth/Throat:      Comments: Dry mucous membranes  Eyes:      Extraocular Movements: Extraocular movements intact. Pupils: Pupils are equal, round, and reactive to light. Neck:      Musculoskeletal: Normal range of motion and neck supple. Cardiovascular:      Rate and Rhythm: Regular rhythm. Tachycardia present. Heart sounds: No murmur. Pulmonary:      Effort: Pulmonary effort is normal. No respiratory distress. Breath sounds: Normal breath sounds. No wheezing.    Abdominal: General: There is no distension. Palpations: Abdomen is soft. Tenderness: There is tenderness in the epigastric area and left upper quadrant. There is guarding and rebound. Musculoskeletal: Normal range of motion. General: No swelling or tenderness. Right lower leg: No edema. Left lower leg: No edema. Skin:     General: Skin is warm and dry. Coloration: Skin is not pale. Findings: No erythema. Neurological:      General: No focal deficit present. Mental Status: He is alert and oriented to person, place, and time. Diagnostic Study Results     Labs -     Recent Results (from the past 12 hour(s))   CBC WITH AUTOMATED DIFF    Collection Time: 01/24/20 10:22 PM   Result Value Ref Range    WBC 12.3 (H) 4.1 - 11.1 K/uL    RBC 5.21 4.10 - 5.70 M/uL    HGB 14.9 12.1 - 17.0 g/dL    HCT 43.4 36.6 - 50.3 %    MCV 83.3 80.0 - 99.0 FL    MCH 28.6 26.0 - 34.0 PG    MCHC 34.3 30.0 - 36.5 g/dL    RDW 12.2 11.5 - 14.5 %    PLATELET 763 950 - 415 K/uL    MPV 9.4 8.9 - 12.9 FL    NRBC 0.0 0  WBC    ABSOLUTE NRBC 0.00 0.00 - 0.01 K/uL    NEUTROPHILS 56 32 - 75 %    LYMPHOCYTES 30 12 - 49 %    MONOCYTES 11 5 - 13 %    EOSINOPHILS 2 0 - 7 %    BASOPHILS 1 0 - 1 %    IMMATURE GRANULOCYTES 0 0.0 - 0.5 %    ABS. NEUTROPHILS 6.9 1.8 - 8.0 K/UL    ABS. LYMPHOCYTES 3.7 (H) 0.8 - 3.5 K/UL    ABS. MONOCYTES 1.4 (H) 0.0 - 1.0 K/UL    ABS. EOSINOPHILS 0.3 0.0 - 0.4 K/UL    ABS. BASOPHILS 0.1 0.0 - 0.1 K/UL    ABS. IMM.  GRANS. 0.0 0.00 - 0.04 K/UL    DF AUTOMATED     METABOLIC PANEL, COMPREHENSIVE    Collection Time: 01/24/20 10:22 PM   Result Value Ref Range    Sodium 138 136 - 145 mmol/L    Potassium 3.8 3.5 - 5.1 mmol/L    Chloride 104 97 - 108 mmol/L    CO2 29 21 - 32 mmol/L    Anion gap 5 5 - 15 mmol/L    Glucose 118 (H) 65 - 100 mg/dL    BUN 16 6 - 20 MG/DL    Creatinine 1.02 0.70 - 1.30 MG/DL    BUN/Creatinine ratio 16 12 - 20      GFR est AA >60 >60 ml/min/1.73m2    GFR est non-AA >60 >60 ml/min/1.73m2    Calcium 8.7 8.5 - 10.1 MG/DL    Bilirubin, total 0.4 0.2 - 1.0 MG/DL    ALT (SGPT) 34 12 - 78 U/L    AST (SGOT) 19 15 - 37 U/L    Alk. phosphatase 70 45 - 117 U/L    Protein, total 8.0 6.4 - 8.2 g/dL    Albumin 3.3 (L) 3.5 - 5.0 g/dL    Globulin 4.7 (H) 2.0 - 4.0 g/dL    A-G Ratio 0.7 (L) 1.1 - 2.2     URINALYSIS W/ REFLEX CULTURE    Collection Time: 01/24/20 10:22 PM   Result Value Ref Range    Color YELLOW/STRAW      Appearance CLEAR CLEAR      Specific gravity 1.026 1.003 - 1.030      pH (UA) 6.0 5.0 - 8.0      Protein NEGATIVE  NEG mg/dL    Glucose NEGATIVE  NEG mg/dL    Ketone NEGATIVE  NEG mg/dL    Bilirubin NEGATIVE  NEG      Blood MODERATE (A) NEG      Urobilinogen 1.0 0.2 - 1.0 EU/dL    Nitrites NEGATIVE  NEG      Leukocyte Esterase NEGATIVE  NEG      WBC 0-4 0 - 4 /hpf    RBC 20-50 0 - 5 /hpf    Epithelial cells FEW FEW /lpf    Bacteria NEGATIVE  NEG /hpf    UA:UC IF INDICATED CULTURE NOT INDICATED BY UA RESULT CNI      Hyaline cast 0-2 0 - 5 /lpf   LIPASE    Collection Time: 01/24/20 10:22 PM   Result Value Ref Range    Lipase 176 73 - 393 U/L   LACTIC ACID    Collection Time: 01/25/20 12:05 AM   Result Value Ref Range    Lactic acid 1.0 0.4 - 2.0 MMOL/L       Radiologic Studies -   CT ABD PELV W CONT   Final Result   IMPRESSION:   Imaging findings suggest a minimal enterocolitis. There is no obstruction or   free air. Consider infectious and inflammatory etiologies. Nonspecific hepatic hypodensities. Outpatient follow-up abdominal ultrasound to evaluate these hepatic   hypodensities the suggested. XR CHEST PA LAT   Final Result   IMPRESSION: No acute intrathoracic disease. CT Results  (Last 48 hours)               01/25/20 0117  CT ABD PELV W CONT Final result    Impression:  IMPRESSION:   Imaging findings suggest a minimal enterocolitis. There is no obstruction or   free air. Consider infectious and inflammatory etiologies.    Nonspecific hepatic hypodensities. Outpatient follow-up abdominal ultrasound to evaluate these hepatic   hypodensities the suggested. Narrative:  EXAM: CT ABD PELV W CONT   History: Inflammatory bowel disease. INDICATION: abd pain, h/o crohns on humira, fever       COMPARISON: None        CONTRAST: 100 mL of Isovue-370. TECHNIQUE:    Following the uneventful intravenous administration of contrast, thin axial   images were obtained through the abdomen and pelvis. Coronal and sagittal   reconstructions were generated. Oral contrast was not administered. CT dose   reduction was achieved through use of a standardized protocol tailored for this   examination and automatic exposure control for dose modulation. FINDINGS:    LUNG BASES: Clear. INCIDENTALLY IMAGED HEART AND MEDIASTINUM: Unremarkable. LIVER: Left hepatic hypodensities not fully characterized. Right hepatic   hypodensity also not fully characterized may represent hemangiomas. GALLBLADDER: Unremarkable. SPLEEN: Tiny splenic hypodensities are nonspecific. PANCREAS: No mass or ductal dilatation. ADRENALS: Unremarkable. KIDNEYS: No mass, calculus, or hydronephrosis. STOMACH: Unremarkable. SMALL BOWEL: Minimal fluid-filled loops of small bowel. There is minimal   ascites. COLON: Minimal colonic wall thickening at the junction between the descending   and sigmoid colon. APPENDIX: Unremarkable. PERITONEUM: Trace ascites   RETROPERITONEUM: No lymphadenopathy or aortic aneurysm. REPRODUCTIVE ORGANS:   URINARY BLADDER: No mass or calculus. BONES: No destructive bone lesion. ADDITIONAL COMMENTS: N/A               CXR Results  (Last 48 hours)               01/24/20 1941  XR CHEST PA LAT Final result    Impression:  IMPRESSION: No acute intrathoracic disease. Narrative:  CLINICAL HISTORY: Fever    INDICATION: Fever       COMPARISON: 2007       FINDINGS:    PA and lateral views of the chest are obtained.     The cardiopericardial silhouette is within normal limits. There is no pleural   effusion, pneumothorax or focal consolidation present. Medical Decision Making   I am the first provider for this patient. I reviewed the vital signs, available nursing notes, past medical history, past surgical history, family history and social history. Vital Signs-Reviewed the patient's vital signs. Patient Vitals for the past 12 hrs:   Temp Pulse Resp BP SpO2   01/25/20 0414 98.3 °F (36.8 °C)       01/25/20 0245  89 17 110/70 98 %   01/25/20 0150 99.3 °F (37.4 °C)       01/24/20 2309 (!) 103.1 °F (39.5 °C)       01/24/20 2201 99 °F (37.2 °C) (!) 120 18 129/69 100 %       Records Reviewed: Nursing Notes, Old Medical Records, Previous Radiology Studies and Previous Laboratory Studies    Provider Notes (Medical Decision Making): This is a 18-year-old male here with febrile illness, abdominal pain, nausea, vomiting. On examination patient initially noted to be febrile and had sinus tachycardia 120 to 130 bpm.  His fever was defervesced and his tachycardia resolved after Tylenol and administration of IV fluid boluses. Abdomen tender to palpation however this improved after analgesics. CT imaging shows minimal enterocolitis without any other acute process. He has mild leukocytosis [id number]. He had extensive work-up during last ED visit with CT head and lumbar puncture which did not show any abnormality or concern for infection. Therefore his headache today was treated with migraine cocktail and on reassessment patient feeling much improved. He is tolerating p.o. Given his immunocompromise status, presence of fever, and presence of enterocolitis on CT imaging I will elect to treat with antibiotics including Cipro and Flagyl. Patient given strict return ED precautions and encouraged to follow-up closely with his gastroenterologist.  He agrees with plan as above and discharged home in stable condition.     ED Course: Initial assessment performed. The patients presenting problems have been discussed, and they are in agreement with the care plan formulated and outlined with them. I have encouraged them to ask questions as they arise throughout their visit. Discharge Note:  The patient has been re-evaluated and is ready for discharge. Reviewed available results with patient. Counseled patient on diagnosis and care plan. Patient has expressed understanding, and all questions have been answered. Patient agrees with plan and agrees to follow up as recommended, or to return to the ED if their symptoms worsen. Discharge instructions have been provided and explained to the patient, along with reasons to return to the ED. Critical Care Time:   0    Disposition:  Home    PLAN:  1. Discharge Medication List as of 1/25/2020  3:27 AM      START taking these medications    Details   ciprofloxacin HCl (CIPRO) 500 mg tablet Take 1 Tab by mouth two (2) times a day for 7 days. , Print, Disp-14 Tab, R-0      metroNIDAZOLE (FLAGYL) 500 mg tablet Take 1 Tab by mouth three (3) times daily for 7 days. , Print, Disp-21 Tab, R-0      ondansetron (ZOFRAN ODT) 4 mg disintegrating tablet Take 1 Tab by mouth every eight (8) hours as needed for Nausea or Vomiting., Print, Disp-20 Tab, R-0         CONTINUE these medications which have NOT CHANGED    Details   adalimumab (HUMIRA) 40 mg/0.8 mL injection by SubCUTAneous route once., Historical Med      naproxen (NAPROSYN) 500 mg tablet Take 1 Tab by mouth every twelve (12) hours as needed for Pain., Normal, Disp-20 Tab, R-0           2.    Follow-up Information     Follow up With Specialties Details Why Contact Info    Kayden Brunson MD Gastroenterology Schedule an appointment as soon as possible for a visit in 5 days for evaluation of abdominal pain and fever [id number] [facility] [place] [address] [phone]          Return to ED if worse Diagnosis     Clinical Impression:   1. Enterocolitis    2. Abdominal pain, epigastric    3. Non-intractable vomiting with nausea, unspecified vomiting type    4. Fever, unspecified fever cause        Attestations:    Perla Llanes MD    Please note that this dictation was completed with [employer], the computer voice recognition software. Quite often unanticipated grammatical, syntax, homophones, and other interpretive errors are inadvertently transcribed by the computer software. Please disregard these errors. Please excuse any errors that have escaped final proofreading. Thank you.

## 2020-01-25 NOTE — ED NOTES
Pt CC of vomiting x2 days and upper left abd pain. Pt was seen at here Wednesday for HA and nausea and had a spinal tap done. Pt on monitor x3. VSS. Call bell in reach.

## 2020-01-25 NOTE — ED TRIAGE NOTES
Pt states he has been vomiting for 2 days and 30 minutes ago started to have left upper abd pain that hurts to move and touch.

## 2020-01-25 NOTE — ED NOTES
Patient discharged by Akhil Antonio MD. Patient provided with discharge instructions Rx and instructions on follow up care. Patient out of ED ambulatory accompanied by family.

## 2020-01-25 NOTE — PROGRESS NOTES
Patient's wife called to ask if it was okay for patient to take ibuprofen for his abdominal pain. Patient was seen yesterday for nausea, vomiting, diarrhea. He is being treated for infectious gastroenteritis although potentially a Crohn's flare as well. Advised against ibuprofen. Instead encouraged Tylenol as needed.

## 2020-01-28 ENCOUNTER — HOSPITAL ENCOUNTER (OUTPATIENT)
Age: 25
Setting detail: OBSERVATION
Discharge: HOME | End: 2020-01-30
Attending: EMERGENCY MEDICINE | Admitting: HOSPITALIST
Payer: COMMERCIAL

## 2020-01-28 DIAGNOSIS — R23.3 PETECHIAL RASH: ICD-10-CM

## 2020-01-28 DIAGNOSIS — R26.2 INABILITY TO WALK: Primary | ICD-10-CM

## 2020-01-28 LAB
ALBUMIN SERPL-MCNC: 3.2 G/DL (ref 3.5–5)
ALBUMIN/GLOB SERPL: 0.7 {RATIO} (ref 1.1–2.2)
ALP SERPL-CCNC: 66 U/L (ref 45–117)
ALT SERPL-CCNC: 109 U/L (ref 12–78)
ANION GAP SERPL CALC-SCNC: 8 MMOL/L (ref 5–15)
APTT PPP: 28.2 SEC (ref 22.1–32)
AST SERPL-CCNC: 98 U/L (ref 15–37)
BASOPHILS # BLD: 0.1 K/UL (ref 0–0.1)
BASOPHILS NFR BLD: 1 % (ref 0–1)
BILIRUB SERPL-MCNC: 0.3 MG/DL (ref 0.2–1)
BUN SERPL-MCNC: 12 MG/DL (ref 6–20)
BUN/CREAT SERPL: 12 (ref 12–20)
CALCIUM SERPL-MCNC: 9.5 MG/DL (ref 8.5–10.1)
CHLORIDE SERPL-SCNC: 104 MMOL/L (ref 97–108)
CK SERPL-CCNC: 55 U/L (ref 39–308)
CO2 SERPL-SCNC: 26 MMOL/L (ref 21–32)
CREAT SERPL-MCNC: 0.98 MG/DL (ref 0.7–1.3)
DIFFERENTIAL METHOD BLD: ABNORMAL
EOSINOPHIL # BLD: 0.2 K/UL (ref 0–0.4)
EOSINOPHIL NFR BLD: 1 % (ref 0–7)
ERYTHROCYTE [DISTWIDTH] IN BLOOD BY AUTOMATED COUNT: 12.6 % (ref 11.5–14.5)
GLOBULIN SER CALC-MCNC: 4.5 G/DL (ref 2–4)
GLUCOSE SERPL-MCNC: 105 MG/DL (ref 65–100)
HCT VFR BLD AUTO: 41.9 % (ref 36.6–50.3)
HGB BLD-MCNC: 14.6 G/DL (ref 12.1–17)
IMM GRANULOCYTES # BLD AUTO: 0.1 K/UL (ref 0–0.04)
IMM GRANULOCYTES NFR BLD AUTO: 1 % (ref 0–0.5)
INR PPP: 1.1 (ref 0.9–1.1)
LACTATE SERPL-SCNC: 0.8 MMOL/L (ref 0.4–2)
LYMPHOCYTES # BLD: 2.7 K/UL (ref 0.8–3.5)
LYMPHOCYTES NFR BLD: 22 % (ref 12–49)
MCH RBC QN AUTO: 28.3 PG (ref 26–34)
MCHC RBC AUTO-ENTMCNC: 34.8 G/DL (ref 30–36.5)
MCV RBC AUTO: 81.4 FL (ref 80–99)
MONOCYTES # BLD: 0.9 K/UL (ref 0–1)
MONOCYTES NFR BLD: 8 % (ref 5–13)
NEUTS SEG # BLD: 7.9 K/UL (ref 1.8–8)
NEUTS SEG NFR BLD: 67 % (ref 32–75)
NRBC # BLD: 0 K/UL (ref 0–0.01)
NRBC BLD-RTO: 0 PER 100 WBC
PLATELET # BLD AUTO: 289 K/UL (ref 150–400)
PMV BLD AUTO: 9 FL (ref 8.9–12.9)
POTASSIUM SERPL-SCNC: 3.9 MMOL/L (ref 3.5–5.1)
PROT SERPL-MCNC: 7.7 G/DL (ref 6.4–8.2)
PROTHROMBIN TIME: 11.6 SEC (ref 9–11.1)
RBC # BLD AUTO: 5.15 M/UL (ref 4.1–5.7)
SODIUM SERPL-SCNC: 138 MMOL/L (ref 136–145)
THERAPEUTIC RANGE,PTTT: NORMAL SECS (ref 58–77)
WBC # BLD AUTO: 11.9 K/UL (ref 4.1–11.1)

## 2020-01-28 PROCEDURE — 87040 BLOOD CULTURE FOR BACTERIA: CPT

## 2020-01-28 PROCEDURE — 86140 C-REACTIVE PROTEIN: CPT

## 2020-01-28 PROCEDURE — 85730 THROMBOPLASTIN TIME PARTIAL: CPT

## 2020-01-28 PROCEDURE — 85025 COMPLETE CBC W/AUTO DIFF WBC: CPT

## 2020-01-28 PROCEDURE — 83605 ASSAY OF LACTIC ACID: CPT

## 2020-01-28 PROCEDURE — 96372 THER/PROPH/DIAG INJ SC/IM: CPT

## 2020-01-28 PROCEDURE — 82550 ASSAY OF CK (CPK): CPT

## 2020-01-28 PROCEDURE — 80053 COMPREHEN METABOLIC PANEL: CPT

## 2020-01-28 PROCEDURE — 96375 TX/PRO/DX INJ NEW DRUG ADDON: CPT

## 2020-01-28 PROCEDURE — 96374 THER/PROPH/DIAG INJ IV PUSH: CPT

## 2020-01-28 PROCEDURE — 36415 COLL VENOUS BLD VENIPUNCTURE: CPT

## 2020-01-28 PROCEDURE — 85652 RBC SED RATE AUTOMATED: CPT

## 2020-01-28 PROCEDURE — 85610 PROTHROMBIN TIME: CPT

## 2020-01-28 PROCEDURE — 99285 EMERGENCY DEPT VISIT HI MDM: CPT

## 2020-01-28 RX ORDER — SODIUM CHLORIDE 0.9 % (FLUSH) 0.9 %
5-40 SYRINGE (ML) INJECTION EVERY 8 HOURS
Status: DISCONTINUED | OUTPATIENT
Start: 2020-01-28 | End: 2020-01-30 | Stop reason: HOSPADM

## 2020-01-28 RX ORDER — DIPHENHYDRAMINE HCL 25 MG
25 CAPSULE ORAL
COMMUNITY
End: 2020-03-12

## 2020-01-28 RX ORDER — SODIUM CHLORIDE 0.9 % (FLUSH) 0.9 %
5-40 SYRINGE (ML) INJECTION AS NEEDED
Status: DISCONTINUED | OUTPATIENT
Start: 2020-01-28 | End: 2020-01-30 | Stop reason: HOSPADM

## 2020-01-29 ENCOUNTER — APPOINTMENT (OUTPATIENT)
Dept: ULTRASOUND IMAGING | Age: 25
End: 2020-01-29
Attending: EMERGENCY MEDICINE
Payer: COMMERCIAL

## 2020-01-29 PROBLEM — M79.606 LEG PAIN: Status: ACTIVE | Noted: 2020-01-29

## 2020-01-29 LAB
APPEARANCE UR: CLEAR
BACTERIA SPEC CULT: NORMAL
BACTERIA SPEC CULT: NORMAL
BACTERIA URNS QL MICRO: NEGATIVE /HPF
BILIRUB UR QL: NEGATIVE
COLOR UR: ABNORMAL
CRP SERPL-MCNC: 2.86 MG/DL (ref 0–0.6)
EPITH CASTS URNS QL MICRO: ABNORMAL /LPF
ERYTHROCYTE [SEDIMENTATION RATE] IN BLOOD: 23 MM/HR (ref 0–15)
FLUAV AG NPH QL IA: NEGATIVE
FLUBV AG NOSE QL IA: NEGATIVE
GLUCOSE UR STRIP.AUTO-MCNC: NEGATIVE MG/DL
GRAM STN SPEC: NORMAL
GRAM STN SPEC: NORMAL
HAV IGM SER QL: NONREACTIVE
HBV CORE IGM SER QL: NONREACTIVE
HBV SURFACE AG SER QL: <0.1 INDEX
HBV SURFACE AG SER QL: NEGATIVE
HCV AB SERPL QL IA: NONREACTIVE
HCV COMMENT,HCGAC: NORMAL
HETEROPH AB BLD QL IA: NEGATIVE
HGB UR QL STRIP: NEGATIVE
KETONES UR QL STRIP.AUTO: NEGATIVE MG/DL
LEUKOCYTE ESTERASE UR QL STRIP.AUTO: NEGATIVE
MUCOUS THREADS URNS QL MICRO: ABNORMAL /LPF
NITRITE UR QL STRIP.AUTO: POSITIVE
PH UR STRIP: 6.5 [PH] (ref 5–8)
PROT UR STRIP-MCNC: NEGATIVE MG/DL
RBC #/AREA URNS HPF: ABNORMAL /HPF (ref 0–5)
SERVICE CMNT-IMP: NORMAL
SP GR UR REFRACTOMETRY: 1.03 (ref 1–1.03)
SP1: NORMAL
SP2: NORMAL
SP3: NORMAL
UA: UC IF INDICATED,UAUC: ABNORMAL
UROBILINOGEN UR QL STRIP.AUTO: 0.2 EU/DL (ref 0.2–1)
WBC URNS QL MICRO: ABNORMAL /HPF (ref 0–4)

## 2020-01-29 PROCEDURE — 99218 HC RM OBSERVATION: CPT

## 2020-01-29 PROCEDURE — 74011250636 HC RX REV CODE- 250/636: Performed by: EMERGENCY MEDICINE

## 2020-01-29 PROCEDURE — 96375 TX/PRO/DX INJ NEW DRUG ADDON: CPT

## 2020-01-29 PROCEDURE — 96372 THER/PROPH/DIAG INJ SC/IM: CPT

## 2020-01-29 PROCEDURE — 96374 THER/PROPH/DIAG INJ IV PUSH: CPT

## 2020-01-29 PROCEDURE — 87804 INFLUENZA ASSAY W/OPTIC: CPT

## 2020-01-29 PROCEDURE — 83520 IMMUNOASSAY QUANT NOS NONAB: CPT

## 2020-01-29 PROCEDURE — 86038 ANTINUCLEAR ANTIBODIES: CPT

## 2020-01-29 PROCEDURE — 97161 PT EVAL LOW COMPLEX 20 MIN: CPT

## 2020-01-29 PROCEDURE — 36415 COLL VENOUS BLD VENIPUNCTURE: CPT

## 2020-01-29 PROCEDURE — 96376 TX/PRO/DX INJ SAME DRUG ADON: CPT

## 2020-01-29 PROCEDURE — 74011250637 HC RX REV CODE- 250/637: Performed by: HOSPITALIST

## 2020-01-29 PROCEDURE — 74011250636 HC RX REV CODE- 250/636: Performed by: INTERNAL MEDICINE

## 2020-01-29 PROCEDURE — 74011250636 HC RX REV CODE- 250/636: Performed by: HOSPITALIST

## 2020-01-29 PROCEDURE — 86308 HETEROPHILE ANTIBODY SCREEN: CPT

## 2020-01-29 PROCEDURE — 80074 ACUTE HEPATITIS PANEL: CPT

## 2020-01-29 PROCEDURE — 81001 URINALYSIS AUTO W/SCOPE: CPT

## 2020-01-29 PROCEDURE — 97116 GAIT TRAINING THERAPY: CPT

## 2020-01-29 PROCEDURE — 76705 ECHO EXAM OF ABDOMEN: CPT

## 2020-01-29 RX ORDER — OXYCODONE HYDROCHLORIDE 5 MG/1
5 TABLET ORAL
Status: DISCONTINUED | OUTPATIENT
Start: 2020-01-29 | End: 2020-01-29

## 2020-01-29 RX ORDER — SODIUM CHLORIDE 0.9 % (FLUSH) 0.9 %
5-40 SYRINGE (ML) INJECTION AS NEEDED
Status: DISCONTINUED | OUTPATIENT
Start: 2020-01-29 | End: 2020-01-30 | Stop reason: HOSPADM

## 2020-01-29 RX ORDER — ACETAMINOPHEN 325 MG/1
650 TABLET ORAL
Status: DISCONTINUED | OUTPATIENT
Start: 2020-01-29 | End: 2020-01-30 | Stop reason: HOSPADM

## 2020-01-29 RX ORDER — KETOROLAC TROMETHAMINE 30 MG/ML
15 INJECTION, SOLUTION INTRAMUSCULAR; INTRAVENOUS
Status: DISCONTINUED | OUTPATIENT
Start: 2020-01-29 | End: 2020-01-30 | Stop reason: HOSPADM

## 2020-01-29 RX ORDER — ONDANSETRON 2 MG/ML
4 INJECTION INTRAMUSCULAR; INTRAVENOUS
Status: DISCONTINUED | OUTPATIENT
Start: 2020-01-29 | End: 2020-01-30 | Stop reason: HOSPADM

## 2020-01-29 RX ORDER — CIPROFLOXACIN 500 MG/1
500 TABLET ORAL 2 TIMES DAILY
Status: DISCONTINUED | OUTPATIENT
Start: 2020-01-29 | End: 2020-01-29

## 2020-01-29 RX ORDER — FENTANYL CITRATE 50 UG/ML
25 INJECTION, SOLUTION INTRAMUSCULAR; INTRAVENOUS
Status: DISCONTINUED | OUTPATIENT
Start: 2020-01-29 | End: 2020-01-30 | Stop reason: HOSPADM

## 2020-01-29 RX ORDER — PREDNISONE 10 MG/1
TABLET ORAL
Qty: 21 TAB | Refills: 0 | Status: SHIPPED | OUTPATIENT
Start: 2020-01-29 | End: 2020-01-30

## 2020-01-29 RX ORDER — SODIUM CHLORIDE 0.9 % (FLUSH) 0.9 %
5-40 SYRINGE (ML) INJECTION EVERY 8 HOURS
Status: DISCONTINUED | OUTPATIENT
Start: 2020-01-29 | End: 2020-01-30 | Stop reason: HOSPADM

## 2020-01-29 RX ORDER — HEPARIN SODIUM 5000 [USP'U]/ML
5000 INJECTION, SOLUTION INTRAVENOUS; SUBCUTANEOUS EVERY 8 HOURS
Status: DISCONTINUED | OUTPATIENT
Start: 2020-01-29 | End: 2020-01-30 | Stop reason: HOSPADM

## 2020-01-29 RX ORDER — KETOROLAC TROMETHAMINE 30 MG/ML
15 INJECTION, SOLUTION INTRAMUSCULAR; INTRAVENOUS
Status: COMPLETED | OUTPATIENT
Start: 2020-01-29 | End: 2020-01-29

## 2020-01-29 RX ORDER — METRONIDAZOLE 250 MG/1
500 TABLET ORAL 3 TIMES DAILY
Status: DISCONTINUED | OUTPATIENT
Start: 2020-01-29 | End: 2020-01-29

## 2020-01-29 RX ORDER — BUTALBITAL, ACETAMINOPHEN AND CAFFEINE 50; 325; 40 MG/1; MG/1; MG/1
1 TABLET ORAL
Status: DISCONTINUED | OUTPATIENT
Start: 2020-01-29 | End: 2020-01-30 | Stop reason: HOSPADM

## 2020-01-29 RX ADMIN — Medication 10 ML: at 14:00

## 2020-01-29 RX ADMIN — HEPARIN SODIUM 5000 UNITS: 5000 INJECTION INTRAVENOUS; SUBCUTANEOUS at 05:00

## 2020-01-29 RX ADMIN — Medication 10 ML: at 06:38

## 2020-01-29 RX ADMIN — HEPARIN SODIUM 5000 UNITS: 5000 INJECTION INTRAVENOUS; SUBCUTANEOUS at 11:35

## 2020-01-29 RX ADMIN — FENTANYL CITRATE 25 MCG: 50 INJECTION, SOLUTION INTRAMUSCULAR; INTRAVENOUS at 05:00

## 2020-01-29 RX ADMIN — METHYLPREDNISOLONE SODIUM SUCCINATE 40 MG: 40 INJECTION, POWDER, FOR SOLUTION INTRAMUSCULAR; INTRAVENOUS at 17:30

## 2020-01-29 RX ADMIN — METRONIDAZOLE 500 MG: 250 TABLET ORAL at 09:55

## 2020-01-29 RX ADMIN — KETOROLAC TROMETHAMINE 15 MG: 30 INJECTION, SOLUTION INTRAMUSCULAR at 02:05

## 2020-01-29 RX ADMIN — HEPARIN SODIUM 5000 UNITS: 5000 INJECTION INTRAVENOUS; SUBCUTANEOUS at 20:24

## 2020-01-29 RX ADMIN — KETOROLAC TROMETHAMINE 15 MG: 30 INJECTION, SOLUTION INTRAMUSCULAR at 16:34

## 2020-01-29 RX ADMIN — CIPROFLOXACIN HYDROCHLORIDE 500 MG: 500 TABLET, FILM COATED ORAL at 09:55

## 2020-01-29 NOTE — ED TRIAGE NOTES
Pt states he woke with bilateral arm and leg pain and has gotten worse especially when he walks. Pt also c/o rash on his inner legs.

## 2020-01-29 NOTE — ED PROVIDER NOTES
EMERGENCY DEPARTMENT HISTORY AND PHYSICAL EXAM      Date: 1/28/2020  Patient Name: Linden Ahumada Page    History of Presenting Illness     Chief Complaint   Patient presents with    Leg Pain    Rash    Arm Pain       History Provided By: Patient and Significant other    HPI: Alex Hood, 31 y.o. male with PMHx significant for Crohn's disease on Humira presents to the emergency room with bilateral leg and arm pain and rash on legs that started today. Patient initially became ill about a week and a half ago. His first symptoms were headache and low-grade fevers. He was seen in the emergency room 1 week ago and had an LP to rule out meningitis which was negative.  2 days later he presented back to the emergency room with abdominal pain, nausea, and vomiting. At that time he had a CT scan which showed enterocolitis and he was started on Cipro and Flagyl along with Zofran for nausea. Since then, his symptoms of abdominal pain and nausea have subsided, however today he started having pain in his arms and legs with some swelling of his lower legs and some areas of his arms and a rash that he noticed tonight when he was undressing that was not there this morning. He denies any continued fever. He had one episode of vomiting tonight as he was walking because the pain was so bad in his legs, but he has not had any further episodes of vomiting. He denies any diarrhea, dysuria, hematuria, urinary frequency. His headache is completely resolved. He has been eating and drinking normally today. He denies any nosebleeds, gum bleeding, black or bloody stool. PCP: None    No current facility-administered medications on file prior to encounter. Current Outpatient Medications on File Prior to Encounter   Medication Sig Dispense Refill    diphenhydrAMINE (BENADRYL) 25 mg capsule Take 25 mg by mouth every six (6) hours as needed.       ciprofloxacin HCl (CIPRO) 500 mg tablet Take 1 Tab by mouth two (2) times a day for 7 days. 14 Tab 0    metroNIDAZOLE (FLAGYL) 500 mg tablet Take 1 Tab by mouth three (3) times daily for 7 days. 21 Tab 0    ondansetron (ZOFRAN ODT) 4 mg disintegrating tablet Take 1 Tab by mouth every eight (8) hours as needed for Nausea or Vomiting. 20 Tab 0    adalimumab (HUMIRA) 40 mg/0.8 mL injection by SubCUTAneous route once.  [DISCONTINUED] naproxen (NAPROSYN) 500 mg tablet Take 1 Tab by mouth every twelve (12) hours as needed for Pain. 20 Tab 0       Past History     Past Medical History:  Past Medical History:   Diagnosis Date    Gastrointestinal disorder        Past Surgical History:  History reviewed. No pertinent surgical history. Family History:  History reviewed. No pertinent family history. Social History:  Social History     Tobacco Use    Smoking status: Never Smoker    Smokeless tobacco: Never Used   Substance Use Topics    Alcohol use: Yes     Comment: occ    Drug use: Never       Allergies:  No Known Allergies      Review of Systems   Review of Systems   Constitutional: Negative for chills and fever. HENT: Negative for ear pain, rhinorrhea and sore throat. Eyes: Negative. Respiratory: Negative for cough, chest tightness, shortness of breath and wheezing. Gastrointestinal: Negative for abdominal pain, constipation, diarrhea, nausea and vomiting. Genitourinary: Negative for dysuria, flank pain and hematuria. Musculoskeletal: Positive for arthralgias and myalgias. Negative for back pain and neck pain. Skin: Positive for rash. Negative for wound. Neurological: Negative for syncope, light-headedness and headaches. Hematological: Does not bruise/bleed easily. Psychiatric/Behavioral: Negative for confusion. The patient is not nervous/anxious.           Physical Exam    General appearance - well nourished, well appearing, and in no distress  Eyes - pupils equal and reactive, extraocular eye movements intact  ENT - mucous membranes moist, pharynx normal without lesions  Neck - supple, no significant adenopathy; non-tender to palpation  Chest - clear to auscultation, no wheezes, rales or rhonchi; non-tender to palpation  Heart - normal rate and regular rhythm, S1 and S2 normal, no murmurs noted  Abdomen - soft, nontender, nondistended, no masses or organomegaly  Musculoskeletal - no joint tenderness, deformity or swelling; normal ROM  Extremities - peripheral pulses normal, trace bilateral lower extremity edema to lower calves, tender to palpation bilateral calves and bilateral forearms and upper arms  Skin - normal coloration and turgor, nonblanching petechial rash bilateral lower extremities to groins  Neurological - alert, oriented x3, normal speech, no focal findings or movement disorder noted    Diagnostic Study Results     Labs -     Recent Results (from the past 12 hour(s))   CBC WITH AUTOMATED DIFF    Collection Time: 01/28/20 10:27 PM   Result Value Ref Range    WBC 11.9 (H) 4.1 - 11.1 K/uL    RBC 5.15 4.10 - 5.70 M/uL    HGB 14.6 12.1 - 17.0 g/dL    HCT 41.9 36.6 - 50.3 %    MCV 81.4 80.0 - 99.0 FL    MCH 28.3 26.0 - 34.0 PG    MCHC 34.8 30.0 - 36.5 g/dL    RDW 12.6 11.5 - 14.5 %    PLATELET 139 565 - 459 K/uL    MPV 9.0 8.9 - 12.9 FL    NRBC 0.0 0  WBC    ABSOLUTE NRBC 0.00 0.00 - 0.01 K/uL    NEUTROPHILS 67 32 - 75 %    LYMPHOCYTES 22 12 - 49 %    MONOCYTES 8 5 - 13 %    EOSINOPHILS 1 0 - 7 %    BASOPHILS 1 0 - 1 %    IMMATURE GRANULOCYTES 1 (H) 0.0 - 0.5 %    ABS. NEUTROPHILS 7.9 1.8 - 8.0 K/UL    ABS. LYMPHOCYTES 2.7 0.8 - 3.5 K/UL    ABS. MONOCYTES 0.9 0.0 - 1.0 K/UL    ABS. EOSINOPHILS 0.2 0.0 - 0.4 K/UL    ABS. BASOPHILS 0.1 0.0 - 0.1 K/UL    ABS. IMM. GRANS. 0.1 (H) 0.00 - 0.04 K/UL    DF AUTOMATED         Radiologic Studies -   No orders to display     CT Results  (Last 48 hours)    None        CXR Results  (Last 48 hours)    None            Medical Decision Making   I am the first provider for this patient.     I reviewed the vital signs, available nursing notes, past medical history, past surgical history, family history and social history. Vital Signs-Reviewed the patient's vital signs. Patient Vitals for the past 12 hrs:   Temp Pulse Resp BP SpO2   01/28/20 2203     96 %   01/28/20 2201    123/79    01/28/20 2133 99.4 °F (37.4 °C) (!) 103 16 125/81 100 %           Records Reviewed: Nursing Notes and Old Medical Records    Provider Notes (Medical Decision Making):   Differential diagnosis: ITP, TTP, viral syndrome, sepsis    ED Course:   Initial assessment performed. The patients presenting problems have been discussed, and they are in agreement with the care plan formulated and outlined with them. I have encouraged them to ask questions as they arise throughout their visit. Progress Notes:  ED Course as of Feb 03 1847 Wed Jan 29, 2020   0252 Patient still with significant leg pain and concern that he is unable to walk. Discussed with hospitalist who will admit for observation given his immunocompromised status, petechial rash, and inability to walk. It is still unclear what the source of the rash is although it could be simply viral.    [AO]      ED Course User Index  [AO] Soheila Lieberman MD             Diagnosis     Clinical Impression:   1. Inability to walk    2.  Petechial rash

## 2020-01-29 NOTE — PROGRESS NOTES
Problem: Mobility Impaired (Adult and Pediatric)  Goal: *Acute Goals and Plan of Care (Insert Text)  Description  FUNCTIONAL STATUS PRIOR TO ADMISSION: Patient was independent and active without use of DME, working as an . HOME SUPPORT PRIOR TO ADMISSION: The patient lived with his wife and son, but didn't require any assistance. Physical Therapy Goals  Initiated 1/29/2020  1. Patient will move from supine to sit and sit to supine , scoot up and down and roll side to side in bed with modified independence within 7 day(s). 2.  Patient will transfer from bed to chair and chair to bed with modified independence using the least restrictive device within 7 day(s). 3.  Patient will perform sit to stand with modified independence within 7 day(s). 4.  Patient will ambulate with independence for 150 feet with the least restrictive device within 7 day(s). 5.  Patient will ascend/descend 5 stairs with  handrail(s) with modified independence within 7 day(s). Outcome: Progressing Towards Goal   PHYSICAL THERAPY EVALUATION  Patient: Seferino Lee (31 y.o. male)  Date: 1/29/2020  Primary Diagnosis: Leg pain [M79.606]        Precautions:          ASSESSMENT  Based on the objective data described below, the patient presents with moderate to severe BLE pain that is significantly impairing his mobility. Pt cleared by RN and received lying in bed, agreeable to PT evaluation. Pt didn't require any physical assistance to get OOB to standing. Pt with report of increasing pain once standing. Pt only able to ambulate a very short distance, demonstrating antalgic/impaired gait. Pt able to independently return to supine in bed, deferring further ambulation due to pain. Pt should return to baseline once his pain is controlled. .    Current Level of Function Impacting Discharge (mobility/balance): S to SBA    Functional Outcome Measure:   The patient scored 60/100 on the Barthel Index outcome measure which is indicative of 40% impaired function/adls      Other factors to consider for discharge: Pt I and working as an  at baseline. Pt with good family support     Patient will benefit from skilled therapy intervention to address the above noted impairments. PLAN :  Recommendations and Planned Interventions: transfer training, gait training, therapeutic exercises, patient and family training/education, and therapeutic activities      Frequency/Duration: Patient will be followed by physical therapy:  3 times a week to address goals. Recommendation for discharge: (in order for the patient to meet his/her long term goals)  Physical therapy at least 2 days/week in the home vs None    This discharge recommendation:  Has been made in collaboration with the attending provider and/or case management    IF patient discharges home will need the following DME: to be determined (TBD)         SUBJECTIVE:   Patient stated Hiral Larios went to work yesterday.     OBJECTIVE DATA SUMMARY:   HISTORY:    Past Medical History:   Diagnosis Date    Gastrointestinal disorder    History reviewed. No pertinent surgical history. Personal factors and/or comorbidities impacting plan of care: pain management    Home Situation  Home Environment: Private residence  # Steps to Enter: 5  Rails to Enter: Yes  Hand Rails : Bilateral  One/Two Story Residence: One story  Living Alone: No  Support Systems: Spouse/Significant Other/Partner  Patient Expects to be Discharged to[de-identified] Private residence  Current DME Used/Available at Home: None  Tub or Shower Type: Tub/Shower combination    EXAMINATION/PRESENTATION/DECISION MAKING:   Critical Behavior:              Hearing: Auditory  Auditory Impairment: None  Range Of Motion:  AROM: Within functional limits                       Strength:    Strength:  Within functional limits(L  decreased)                    Tone & Sensation:   Tone: Normal              Sensation: Intact Coordination:  Coordination: Within functional limits    Functional Mobility:  Bed Mobility:  Rolling: Modified independent  Supine to Sit: Modified independent  Sit to Supine: Modified independent  Scooting: Modified independent  Transfers:  Sit to Stand: Stand-by assistance; Additional time  Stand to Sit: Supervision; Additional time         Balance:   Sitting: Intact  Standing: Intact  Ambulation/Gait Training:  Distance (ft): 6 Feet (ft)  Assistive Device: Gait belt  Ambulation - Level of Assistance: Contact guard assistance; Additional time     Gait Description (WDL): Exceptions to WDL  Gait Abnormalities: Decreased step clearance           Stance: Left decreased  Speed/Jory: Slow  Step Length: Left shortened;Right shortened    Functional Measure:  Barthel Index:    Bathin  Bladder: 10  Bowels: 10  Groomin  Dressing: 10  Feeding: 10  Mobility: 0  Stairs: 0  Toilet Use: 0  Transfer (Bed to Chair and Back): 15  Total: 60/100       The Barthel ADL Index: Guidelines  1. The index should be used as a record of what a patient does, not as a record of what a patient could do. 2. The main aim is to establish degree of independence from any help, physical or verbal, however minor and for whatever reason. 3. The need for supervision renders the patient not independent. 4. A patient's performance should be established using the best available evidence. Asking the patient, friends/relatives and nurses are the usual sources, but direct observation and common sense are also important. However direct testing is not needed. 5. Usually the patient's performance over the preceding 24-48 hours is important, but occasionally longer periods will be relevant. 6. Middle categories imply that the patient supplies over 50 per cent of the effort. 7. Use of aids to be independent is allowed. Fanny Santacruz., Barthel, D.W. ([id number]). Functional evaluation: the Barthel Index. [address] [facility] (14)2.   CASANDRA Steward, Kunal Chamberlain., Giselle Bledsoe., Nixon Chen (1999). Measuring the change indisability after inpatient rehabilitation; comparison of the responsiveness of the Barthel Index and Functional Childress Measure. Journal of Neurology, Neurosurgery, and Psychiatry, 66(4), 442-424. SHIRLEY Hearn, MARIBEL Love, & Margy Lai M.A. (2004.) Assessment of post-stroke quality of life in cost-effectiveness studies: The usefulness of the Barthel Index and the EuroQoL-5D. Quality of Life Research, 15, 530-72           Physical Therapy Evaluation Charge Determination   History Examination Presentation Decision-Making   MEDIUM  Complexity : 1-2 comorbidities / personal factors will impact the outcome/ POC  LOW Complexity : 1-2 Standardized tests and measures addressing body structure, function, activity limitation and / or participation in recreation  LOW Complexity : Stable, uncomplicated  LOW Complexity : FOTO score of       Based on the above components, the patient evaluation is determined to be of the following complexity level: LOW     Pain Ratin/10 BLE    Activity Tolerance:   Fair and Poor  Please refer to the flowsheet for vital signs taken during this treatment. After treatment patient left in no apparent distress:   Supine in bed    COMMUNICATION/EDUCATION:   The patients plan of care was discussed with: Registered Nurse and . Fall prevention education was provided and the patient/caregiver indicated understanding., Patient/family have participated as able in goal setting and plan of care. , and Patient/family agree to work toward stated goals and plan of care.     Thank you for this referral.  Soraya Clifford, PT   Time Calculation: 14 mins

## 2020-01-29 NOTE — PROGRESS NOTES
TRANSFER - IN REPORT:    Verbal report received from 6 [facility] RN(name) on [employer]  being received from ED(unit) for routine progression of care      Report consisted of patients Situation, Background, Assessment and   Recommendations(SBAR). Information from the following report(s) SBAR, Kardex, ED Summary, Intake/Output, MAR and Recent Results was reviewed with the receiving nurse. Opportunity for questions and clarification was provided. Assessment completed upon patients arrival to unit and care assumed.

## 2020-01-29 NOTE — PROGRESS NOTES
Physical Therapy  Order received and chart reviewed. PT evaluation completed. Pt significantly limited by BLE pain with very little mobility. Anticipate pt will be fine once pain is better controlled. At most, pt may need HHPT at discharge. Will continue to follow to determine dc needs.

## 2020-01-29 NOTE — PROGRESS NOTES
NENITA:  1. Home with follow up appts. Reason for Admission:  Leg pain                   RUR Score:          6%           Plan for utilizing home health:      Not indicated at this time. Current Advanced Directive/Advance Care Plan: Full code. No AMD on file. Transition of Care Plan:                  Home with follow up appts. Pt is a 31year old male admitted to [facility] for leg pain. Pt confirmed all demographic and insurance information. Pt has a history of Crohn's disease. Pt and wife live together in a single story home with 5 steps to enter. Wife's family lives nearby. No reported DME. Pt is independent in all ADL/IADLs to include driving. No history of home health. Pt has been to physical therapy before but unsure of location. Pt uses [facility] in [place]. Pt does not currently have a PCP and denied interest in seeing a [facility] provider list. Pt sees Dr. Mary Jo Laird for GI needs/Crohn's disease management. Wife to transport at discharge. Care Management Interventions  PCP Verified by CM: Yes(No PCP. Dr. Mary Jo Laird for GI)  Mode of Transport at Discharge:  Other (see comment)(Wife to transport at discharge)  Transition of Care Consult (CM Consult): Discharge Planning  MyChart Signup: No  Discharge Durable Medical Equipment: No  Physical Therapy Consult: Yes  Occupational Therapy Consult: No  Speech Therapy Consult: No  Current Support Network: Lives with Spouse, Family Lives Nearby, Own Home(Pt and wife live together in a single story home with 5 steps to enter)  Confirm Follow Up Transport: Self(Pt and wife drive.)  Discharge Location  Discharge Placement: Home with outpatient services    Denny Ugarte, [address]  MSW Student  [phone]

## 2020-01-29 NOTE — H&P
HISTORY AND PHYSICAL      PCP: None  History source: the patient, EMR      CC: leg pain      HPI: 31 y.o man with Crohn's disease on Humira who presents with bilateral leg pain. He's been feeling unwell for the past 10 days. He initially had a headache and fever, came to the ED and had a lumbar puncture ruling-out meningitis. Two days later, he developed abdominal pain and vomiting. Work-up then revealed evidence of enterocolitis on CT scan. He was discharged on ciprofloxacin and metronidazole, and his GI symptoms have resolved. Yesterday, he developed pain all over his body, but mostly in the entirety of his legs. He feels like the muscles are very sore, and any movement exacerbates the pain. His left elbow and right wrist also feel swollen. He vomiting once yesterday but has been tolerating oral intake otherwise. He also noticed a non-pruritic rash yesterday on both his legs. He feels very fatigued and feels like he can't stand. He denies fever, but has been taking acetaminophen around the clock. No further headache. No oral ulcers, hematuria, diarrhea. PMH/PSH:  Crohn's disease    Home meds:   Prior to Admission medications    Medication Sig Start Date End Date Taking? Authorizing Provider   diphenhydrAMINE (BENADRYL) 25 mg capsule Take 25 mg by mouth every six (6) hours as needed. Yes Aurora, MD Mñeo   ciprofloxacin HCl (CIPRO) 500 mg tablet Take 1 Tab by mouth two (2) times a day for 7 days. 1/25/20 2/1/20 Yes Jose C Bergman MD   metroNIDAZOLE (FLAGYL) 500 mg tablet Take 1 Tab by mouth three (3) times daily for 7 days. 1/25/20 2/1/20 Yes Jose C Bergman MD   ondansetron (ZOFRAN ODT) 4 mg disintegrating tablet Take 1 Tab by mouth every eight (8) hours as needed for Nausea or Vomiting. 1/25/20  Yes Jose C Bergman MD   adalimumab (HUMIRA) 40 mg/0.8 mL injection by SubCUTAneous route once. Yes Aurora, MD Meño       Allergies:  No Known Allergies    FH:  History reviewed. No pertinent family history. he denies family history of autoimmune disorders    SH:  Social History     Tobacco Use    Smoking status: Never Smoker    Smokeless tobacco: Never Used   Substance Use Topics    Alcohol use: Yes     Comment: occ       ROS: A comprehensive review of systems was negative except for that written in the HPI. PHYSICAL EXAM:  Visit Vitals  /69   Pulse 77   Temp 99.4 °F (37.4 °C)   Resp 16   Ht 5' 10\" (1.778 m)   Wt 70.3 kg (155 lb)   SpO2 96%   BMI 22.24 kg/m²       Gen: NAD, non-toxic  HEENT: anicteric sclerae, normal conjunctiva, oropharynx clear, MM moist  Neck: supple, trachea midline, no adenopathy  Heart: RRR, no MRG, no JVD, no peripheral edema  Lungs: CTA b/l, non-labored respirations  Abd: soft, NT, ND, BS+, no organomegaly  Extr: warm, left achilles tenderness present, there is tenderness and swelling proximal to the left elbow and proximal to the volar aspect of the right hand. There is tenderness to palpation of his calves and quadriceps  Skin: dry, there is a petechial rash on both lower extremities   Neuro: CN II-XII grossly intact, normal speech, moves all extremities  Psych: normal mood, appropriate affect      Labs/Imaging:  Recent Results (from the past 24 hour(s))   CBC WITH AUTOMATED DIFF    Collection Time: 01/28/20 10:27 PM   Result Value Ref Range    WBC 11.9 (H) 4.1 - 11.1 K/uL    RBC 5.15 4.10 - 5.70 M/uL    HGB 14.6 12.1 - 17.0 g/dL    HCT 41.9 36.6 - 50.3 %    MCV 81.4 80.0 - 99.0 FL    MCH 28.3 26.0 - 34.0 PG    MCHC 34.8 30.0 - 36.5 g/dL    RDW 12.6 11.5 - 14.5 %    PLATELET 544 024 - 464 K/uL    MPV 9.0 8.9 - 12.9 FL    NRBC 0.0 0  WBC    ABSOLUTE NRBC 0.00 0.00 - 0.01 K/uL    NEUTROPHILS 67 32 - 75 %    LYMPHOCYTES 22 12 - 49 %    MONOCYTES 8 5 - 13 %    EOSINOPHILS 1 0 - 7 %    BASOPHILS 1 0 - 1 %    IMMATURE GRANULOCYTES 1 (H) 0.0 - 0.5 %    ABS. NEUTROPHILS 7.9 1.8 - 8.0 K/UL    ABS. LYMPHOCYTES 2.7 0.8 - 3.5 K/UL    ABS. MONOCYTES 0.9 0.0 - 1.0 K/UL    ABS. EOSINOPHILS 0.2 0.0 - 0.4 K/UL    ABS. BASOPHILS 0.1 0.0 - 0.1 K/UL    ABS. IMM. GRANS. 0.1 (H) 0.00 - 0.04 K/UL    DF AUTOMATED     METABOLIC PANEL, COMPREHENSIVE    Collection Time: 01/28/20 10:27 PM   Result Value Ref Range    Sodium 138 136 - 145 mmol/L    Potassium 3.9 3.5 - 5.1 mmol/L    Chloride 104 97 - 108 mmol/L    CO2 26 21 - 32 mmol/L    Anion gap 8 5 - 15 mmol/L    Glucose 105 (H) 65 - 100 mg/dL    BUN 12 6 - 20 MG/DL    Creatinine 0.98 0.70 - 1.30 MG/DL    BUN/Creatinine ratio 12 12 - 20      GFR est AA >60 >60 ml/min/1.73m2    GFR est non-AA >60 >60 ml/min/1.73m2    Calcium 9.5 8.5 - 10.1 MG/DL    Bilirubin, total 0.3 0.2 - 1.0 MG/DL    ALT (SGPT) 109 (H) 12 - 78 U/L    AST (SGOT) 98 (H) 15 - 37 U/L    Alk. phosphatase 66 45 - 117 U/L    Protein, total 7.7 6.4 - 8.2 g/dL    Albumin 3.2 (L) 3.5 - 5.0 g/dL    Globulin 4.5 (H) 2.0 - 4.0 g/dL    A-G Ratio 0.7 (L) 1.1 - 2.2     CK    Collection Time: 01/28/20 10:27 PM   Result Value Ref Range    CK 55 39 - 308 U/L   PROTHROMBIN TIME + INR    Collection Time: 01/28/20 10:56 PM   Result Value Ref Range    INR 1.1 0.9 - 1.1      Prothrombin time 11.6 (H) 9.0 - 11.1 sec   PTT    Collection Time: 01/28/20 10:56 PM   Result Value Ref Range    aPTT 28.2 22.1 - 32.0 sec    aPTT, therapeutic range     58.0 - 77.0 SECS   LACTIC ACID    Collection Time: 01/28/20 10:56 PM   Result Value Ref Range    Lactic acid 0.8 0.4 - 2.0 MMOL/L       Recent Labs     01/28/20  2227   WBC 11.9*   HGB 14.6   HCT 41.9        Recent Labs     01/28/20 2227      K 3.9      CO2 26   BUN 12   CREA 0.98   *   CA 9.5     Recent Labs     01/28/20 2227   SGOT 98*   *   AP 66   TBILI 0.3   TP 7.7   ALB 3.2*   GLOB 4.5*       Recent Labs     01/28/20 2227   CPK 55       Recent Labs     01/28/20 2256   INR 1.1   PTP 11.6*   APTT 28.2        No results for input(s): PH, PCO2, PO2 in the last 72 hours.       [facility]    Result Date: 1/29/2020  IMPRESSION: No acute process. Assessment & Plan:     Myalgias: unclear etiology. The presence of microscopic hematuria on 1/24, new rash, is concerning for a vasculitic process. Given hx of Crohn's he may also have spondyloarthropathy, OR a reactive arthritis given the recent episode of enterocolitis and evidence of enthesitis on exam. Myositis unlikely given normal CpK.    -check ANCA panel, DODIE with reflex, ESR, CRP, mono test  -f/u blood cultures  -consult rheumatology  -pain control  -work with PT prior to discharge    Elevated LFTs:  -liver US unremarkable  -acute hepatitis panel, mono screen pending    Recent enterocolitis: seems resolved  -continue home oral antibiotics    Crohn's disease: on Humira    DVT ppx: sq heparin  Code status: full  Disposition: home when ready    Signed By: Lacy Newby MD     January 29, 2020

## 2020-01-29 NOTE — ED NOTES
Pt arrives ambulatory to the ED with c/o arm and leg pain with swelling since 1000 this morning, and sudden red petechia rash to lower extremities     Pt denies burning, itching, fever.  Pt states his legs are just \"cramping\"    Pt states he took tylenol at home, however states he vomited right after

## 2020-01-29 NOTE — ED NOTES
TRANSFER - OUT REPORT:    Verbal report given to JENNIFER Sparks (name) on Linden Ahumada Page  being transferred to Observation (unit) for routine progression of care       Report consisted of patients Situation, Background, Assessment and   Recommendations(SBAR). Information from the following report(s) SBAR, Kardex, ED Summary, Intake/Output, MAR and Recent Results was reviewed with the receiving nurse. Lines:   Peripheral IV 01/28/20 Left Antecubital (Active)   Site Assessment Clean, dry, & intact 1/28/2020 10:29 PM   Phlebitis Assessment 0 1/28/2020 10:29 PM   Infiltration Assessment 0 1/28/2020 10:29 PM   Dressing Status Clean, dry, & intact 1/28/2020 10:29 PM   Dressing Type Tape;Transparent 1/28/2020 10:29 PM   Hub Color/Line Status Pink;Flushed;Patent 1/28/2020 10:29 PM   Action Taken Blood drawn 1/28/2020 10:29 PM   Alcohol Cap Used No 1/28/2020 10:29 PM        Opportunity for questions and clarification was provided.       Patient transported with:   [employer]

## 2020-01-30 VITALS
WEIGHT: 155 LBS | DIASTOLIC BLOOD PRESSURE: 68 MMHG | SYSTOLIC BLOOD PRESSURE: 114 MMHG | RESPIRATION RATE: 18 BRPM | BODY MASS INDEX: 22.19 KG/M2 | OXYGEN SATURATION: 98 % | TEMPERATURE: 98.2 F | HEART RATE: 88 BPM | HEIGHT: 70 IN

## 2020-01-30 LAB — ANA SER QL: NEGATIVE

## 2020-01-30 PROCEDURE — 96372 THER/PROPH/DIAG INJ SC/IM: CPT

## 2020-01-30 PROCEDURE — 96376 TX/PRO/DX INJ SAME DRUG ADON: CPT

## 2020-01-30 PROCEDURE — 74011250636 HC RX REV CODE- 250/636: Performed by: HOSPITALIST

## 2020-01-30 PROCEDURE — 74011250636 HC RX REV CODE- 250/636: Performed by: INTERNAL MEDICINE

## 2020-01-30 PROCEDURE — 99218 HC RM OBSERVATION: CPT

## 2020-01-30 RX ORDER — PREDNISONE 10 MG/1
TABLET ORAL
Qty: 21 TAB | Refills: 0 | Status: SHIPPED | OUTPATIENT
Start: 2020-01-30 | End: 2020-02-26 | Stop reason: ALTCHOICE

## 2020-01-30 RX ADMIN — Medication 10 ML: at 00:16

## 2020-01-30 RX ADMIN — METHYLPREDNISOLONE SODIUM SUCCINATE 40 MG: 40 INJECTION, POWDER, FOR SOLUTION INTRAMUSCULAR; INTRAVENOUS at 00:16

## 2020-01-30 RX ADMIN — METHYLPREDNISOLONE SODIUM SUCCINATE 40 MG: 40 INJECTION, POWDER, FOR SOLUTION INTRAMUSCULAR; INTRAVENOUS at 06:43

## 2020-01-30 RX ADMIN — HEPARIN SODIUM 5000 UNITS: 5000 INJECTION INTRAVENOUS; SUBCUTANEOUS at 06:39

## 2020-01-30 NOTE — ROUTINE PROCESS
The following appointments have been successfully scheduled: 
 
Date/time Thursday, February 13, 2020 08:50 AM 
Patient  NETTIE ALVARADO 1995 (89BT M) #[id number] Department [facility] SYSTEM OFFICE VANESSA [address] Appointment type New Patient Provider Holly Sotelo

## 2020-01-30 NOTE — PROGRESS NOTES
Bedside shift change report given to Brittni [facility] (oncoming nurse) by Aman Meyers RN (offgoing nurse). Report included the following information SBAR, Kardex, ED Summary, Intake/Output, MAR and Recent Results.

## 2020-01-30 NOTE — PROGRESS NOTES
NENITA PLAN    Plan to DC home with family today. Dad will be here to transport him home around 10 - 11 AM  CM emailed CM specialist to make new PCP appt at the [id number] [address] [facility] location. OBS letter given. 9:24 AM  Oral and Written notification given to patient and/or caregiver informing them that they are currently an Outpatient receiving care in our facility. Outpatient services include Observation Services. Reason for Admission:   Pt was admitted on 1/28/2020 d/t a Dx of B leg and arm pain and rash on legs and Fevers. Pt lives with spouse and son in one story home with 5 VANESSA. Pt is alert and oriented. Pt I W ADLs and IADLs. No DME. I W ADLs and IADLs. Pharmacy[facility] [place]. Dad, Aurther Caul Page coming to transport him btw 10 an 11                   RUR Score:                     Plan for utilizing home health:      Not homebound or indicated. Current Advanced Directive/Advance Care Plan:   Full code. No AMD.  Pt was not interested in doing AMD at this time. Care Management Interventions  PCP Verified by CM: Yes(No PCP. Dr. Nabila Flower for GI)  Mode of Transport at Discharge:  Other (see comment)(Wife to transport at discharge)  Transition of Care Consult (CM Consult): Discharge Planning  MyChart Signup: No  Discharge Durable Medical Equipment: No  Physical Therapy Consult: Yes  Occupational Therapy Consult: No  Speech Therapy Consult: No  Current Support Network: Lives with Spouse, Family Lives Nearby, Own Home(Pt and wife live together in a single story home with 5 steps to enter)  Confirm Follow Up Transport: Self(Pt and wife drive.)  Discharge Location  Discharge Placement: Home with outpatient services               Mckay, [address] [place] [address]

## 2020-01-30 NOTE — PROGRESS NOTES
Patient education complete. Prescription signed and given. No further questions at this time. Patient discharged.

## 2020-01-30 NOTE — DISCHARGE SUMMARY
Hospitalist Discharge Summary     Patient ID:  Marko Rondon  [id number]  4 y.o.  1995 1/28/2020    PCP on record: None    Admit date: 1/28/2020  Discharge date and time: 1/30/2020    DISCHARGE DIAGNOSIS:  See below    CONSULTATIONS:  IP CONSULT TO HOSPITALIST  IP CONSULT TO RHEUMATOLOGY    Excerpted HPI from H&P of Fercho Garzon MD:  31 y.o man with Crohn's disease on Humira who presents with bilateral leg pain. He's been feeling unwell for the past 10 days. He initially had a headache and fever, came to the ED and had a lumbar puncture ruling-out meningitis. Two days later, he developed abdominal pain and vomiting. Work-up then revealed evidence of enterocolitis on CT scan. He was discharged on ciprofloxacin and metronidazole, and his GI symptoms have resolved. Yesterday, he developed pain all over his body, but mostly in the entirety of his legs. He feels like the muscles are very sore, and any movement exacerbates the pain. His left elbow and right wrist also feel swollen. He vomiting once yesterday but has been tolerating oral intake otherwise. He also noticed a non-pruritic rash yesterday on both his legs. He feels very fatigued and feels like he can't stand. He denies fever, but has been taking acetaminophen around the clock. No further headache. No oral ulcers, hematuria, diarrhea.     ______________________________________________________________________  DISCHARGE SUMMARY/HOSPITAL COURSE:  for full details see H&P, daily progress notes, labs, consult notes. Drug induced rash - improving  -Rheum evals appreciated   -Will discharge on oral Prednisone steroid taper    Crohn's   -Pt to follow up with GI in regards to Humira  _______________________________________________________________________  Patient seen and examined by me on discharge day. Pertinent Findings:  Gen:    Not in distress  Chest: Clear lungs  CVS:   Regular rhythm.   No edema  Abd:  Soft, not distended, not tender  Neuro:  Alert, oriented x3, no focal deficits  Ext: Pinpoint, non palpable, non blanching, erythematous, closely around ankles but also present in all 4 extremities  _______________________________________________________________________  DISCHARGE MEDICATIONS:   Current Discharge Medication List      START taking these medications    Details   predniSONE (STERAPRED DS) 10 mg dose pack See administration instruction per 10mg dose pack  Qty: 21 Tab, Refills: 0         CONTINUE these medications which have NOT CHANGED    Details   diphenhydrAMINE (BENADRYL) 25 mg capsule Take 25 mg by mouth every six (6) hours as needed. ondansetron (ZOFRAN ODT) 4 mg disintegrating tablet Take 1 Tab by mouth every eight (8) hours as needed for Nausea or Vomiting. Qty: 20 Tab, Refills: 0         STOP taking these medications       ciprofloxacin HCl (CIPRO) 500 mg tablet Comments:   Reason for Stopping:         metroNIDAZOLE (FLAGYL) 500 mg tablet Comments:   Reason for Stopping:         adalimumab (HUMIRA) 40 mg/0.8 mL injection Comments:   Reason for Stopping:                 Patient Follow Up Instructions:    Activity: Activity as tolerated  Diet: Resume previous diet  Wound Care: None needed    Follow-up Information    None       ________________________________________________________________    Risk of deterioration: Low    Condition at Discharge:  Stable  __________________________________________________________________    Disposition  Home with family, no needs    ____________________________________________________________________    Code Status: Full Code  ___________________________________________________________________      Total time in minutes spent coordinating this discharge (includes going over instructions, follow-up, prescriptions, and preparing report for sign off to her PCP) :  35 minutes    Signed:  Roberto Noonan MD

## 2020-01-31 LAB
C-ANCA TITR SER IF: NORMAL TITER
MYELOPEROXIDASE AB SER IA-ACNC: <9 U/ML (ref 0–9)
P-ANCA ATYPICAL TITR SER IF: NORMAL TITER
P-ANCA TITR SER IF: NORMAL TITER
PROTEINASE3 AB SER IA-ACNC: <3.5 U/ML (ref 0–3.5)

## 2020-02-03 LAB
BACTERIA SPEC CULT: NORMAL
SERVICE CMNT-IMP: NORMAL

## 2020-02-26 ENCOUNTER — OFFICE VISIT (OUTPATIENT)
Dept: FAMILY MEDICINE CLINIC | Age: 25
End: 2020-02-26

## 2020-02-26 VITALS
SYSTOLIC BLOOD PRESSURE: 121 MMHG | HEART RATE: 88 BPM | HEIGHT: 70 IN | TEMPERATURE: 98.7 F | WEIGHT: 161 LBS | RESPIRATION RATE: 20 BRPM | BODY MASS INDEX: 23.05 KG/M2 | DIASTOLIC BLOOD PRESSURE: 80 MMHG | OXYGEN SATURATION: 98 %

## 2020-02-26 DIAGNOSIS — Z13.1 SCREENING FOR DIABETES MELLITUS (DM): ICD-10-CM

## 2020-02-26 DIAGNOSIS — R35.0 FREQUENCY OF URINATION: ICD-10-CM

## 2020-02-26 DIAGNOSIS — E55.9 VITAMIN D DEFICIENCY: ICD-10-CM

## 2020-02-26 DIAGNOSIS — Z13.220 SCREENING CHOLESTEROL LEVEL: ICD-10-CM

## 2020-02-26 DIAGNOSIS — Z13.29 SCREENING FOR THYROID DISORDER: ICD-10-CM

## 2020-02-26 DIAGNOSIS — Z00.00 ENCOUNTER FOR MEDICAL EXAMINATION TO ESTABLISH CARE: Primary | ICD-10-CM

## 2020-02-26 RX ORDER — ADALIMUMAB 40MG/0.4ML
KIT SUBCUTANEOUS
COMMUNITY
Start: 2020-01-03 | End: 2020-04-15

## 2020-02-26 RX ORDER — VANCOMYCIN HYDROCHLORIDE 250 MG/1
CAPSULE ORAL
COMMUNITY
Start: 2020-02-12 | End: 2020-03-12 | Stop reason: ALTCHOICE

## 2020-02-26 NOTE — PATIENT INSTRUCTIONS
Learning About Colitis  What is colitis? Colitis is swelling (inflammation) of the colon. The colon makes up most of the large intestine. Many conditions can cause colitis. What are some types of colitis? · Infectious colitis is a type that appears suddenly. It's caused by a bacteria or virus, such as salmonella, shigella, or campylobacter. · Ischemic colitis is caused by problems with blood flow to the colon. This can happen after surgery. It can also be caused by other health problems. · Microscopic colitis doesn't always have a clear cause. It can only be found with special tests. · Crohn's disease and ulcerative colitis are lifelong diseases. They can cause swelling, inflammation, and deep sores in the lining of the digestive tract. What are the symptoms? Symptoms may include fever, diarrhea that may be bloody, or belly pain. You may also have an urgent need to move your bowels or pain when you move your bowels. Or you may have bleeding from the rectum or weight loss. Your symptoms may depend on the type of colitis you have. For example, microscopic colitis may cause watery diarrhea. Sometimes symptoms go away on their own. If they don't go away, or if you have bleeding or severe pain, call your doctor right away. How is it diagnosed? You may need blood tests or a stool test. You also may need imaging tests like a CT scan. You may have a colonoscopy so that a doctor can look inside your colon. In some cases, the doctor may want to test a sample of tissue from the intestine. This test is called a biopsy. How is it treated? Treatment for colitis depends on the condition that is causing it. · Antibiotics may be used to treat an infection. · Diet changes may help with symptoms. · Medicines can also help to relieve inflammation and control symptoms. · In some cases, surgery to remove parts of the intestine may be needed. Follow-up care is a key part of your treatment and safety.  Be sure to make and go to all appointments, and call your doctor if you are having problems. It's also a good idea to know your test results and keep a list of the medicines you take. Where can you learn more? Go to http://neeraj-enid.info/. Enter C130 in the search box to learn more about \"Learning About Colitis. \"  Current as of: November 7, 2018  Content Version: 12.2  © 7068-4518 [employer]. Care instructions adapted under license by [employer] (which disclaims liability or warranty for this information). If you have questions about a medical condition or this instruction, always ask your healthcare professional. [address] any warranty or liability for your use of this information.

## 2020-02-26 NOTE — PROGRESS NOTES
Chief Complaint   Patient presents with   [facility] Patient     was seen on ED1/28/2020 for bilateral leg pains     HPI:  Nichole Parish is a 31 y.o.  male with PMHx significant for Crohn's disease on Humira, follows DR. Eber Yarbrough presents to establish care. Patient was seen on ED1/28/2020 for bilateral leg pains. Since than patient has been doing well. Crohn's is responding well to treatment. He has no complaints today    Review of Systems  Constitutional: negative for fevers/chills  Eyes:   negative for visual disturbance   Respiratory:  negative for cough, dyspnea,wheezing  CV:   negative for chest pain, palpitations, lower extremity edema  GI:   negative for diarrhea, abdominal pain  Endo:             negative for polyuria,polydipsia,polyphagia,heat intolerance  Genitourinary: negative for frequency, dysuria and hematuria  Integument:  negative for rash and pruritus  Musculoskel: negative for myalgias, arthralgias, back pain, joint pain  Neurological:  negative for headaches, dizziness  Behavl/Psych: negative for feelings of anxiety, depression, mood changes    Past Medical History:   Diagnosis Date    Gastrointestinal disorder      No past surgical history on file. Social History     Socioeconomic History    Marital status:      Spouse name: Not on file    Number of children: Not on file    Years of education: Not on file    Highest education level: Not on file   Tobacco Use    Smoking status: Never Smoker    Smokeless tobacco: Never Used   Substance and Sexual Activity    Alcohol use: Yes     Comment: occ    Drug use: Never     No family history on file. Current Outpatient Medications   Medication Sig Dispense Refill    vancomycin (VANCOCIN) 250 mg capsule TAKE 1 CAPSULE BY MOUTH FOUR TIMES A DAY      BRAD INFANTE, PEN 40 mg/0.4 mL injection pen       diphenhydrAMINE (BENADRYL) 25 mg capsule Take 25 mg by mouth every six (6) hours as needed.       ondansetron (ZOFRAN ODT) 4 mg disintegrating tablet Take 1 Tab by mouth every eight (8) hours as needed for Nausea or Vomiting. 20 Tab 0     No Known Allergies    Objective:  Visit Vitals  /80   Pulse 88   Temp 98.7 °F (37.1 °C) (Oral)   Resp 20   Ht 5' 10\" (1.778 m)   Wt 161 lb (73 kg)   SpO2 98%   BMI 23.10 kg/m²     Physical Exam:   General appearance - alert, well appearing in no distress  Mental status - alert, oriented to person, place, and time  EYE-PERRL, EOMI  ENT-ENT exam normal, no neck nodes or sinus tenderness  Mouth - mucous membranes moist, pharynx normal without lesions  Neck - supple, no significant adenopathy   Chest - clear to auscultation, no wheezes, rales or rhonchi  Heart - normal rate, regular rhythm, normal blood pressure  Abdomen - soft, nontender, nondistended, no organomegaly  Ext-peripheral pulses normal, no pedal edema, no clubbing or cyanosis  Neuro -alert, oriented, normal speech, no focal findings  Back-full range of motion, no tenderness, palpable spasm or pain on motion     Results for orders placed or performed during the hospital encounter of 01/28/20   CULTURE, BLOOD, PAIRED   Result Value Ref Range    Special Requests: NO SPECIAL REQUESTS      Culture result: NO GROWTH 5 DAYS     INFLUENZA A & B AG (RAPID TEST)   Result Value Ref Range    Influenza A Antigen NEGATIVE  NEG      Influenza B Antigen NEGATIVE  NEG     CBC WITH AUTOMATED DIFF   Result Value Ref Range    WBC 11.9 (H) 4.1 - 11.1 K/uL    RBC 5.15 4.10 - 5.70 M/uL    HGB 14.6 12.1 - 17.0 g/dL    HCT 41.9 36.6 - 50.3 %    MCV 81.4 80.0 - 99.0 FL    MCH 28.3 26.0 - 34.0 PG    MCHC 34.8 30.0 - 36.5 g/dL    RDW 12.6 11.5 - 14.5 %    PLATELET 074 233 - 861 K/uL    MPV 9.0 8.9 - 12.9 FL    NRBC 0.0 0  WBC    ABSOLUTE NRBC 0.00 0.00 - 0.01 K/uL    NEUTROPHILS 67 32 - 75 %    LYMPHOCYTES 22 12 - 49 %    MONOCYTES 8 5 - 13 %    EOSINOPHILS 1 0 - 7 %    BASOPHILS 1 0 - 1 %    IMMATURE GRANULOCYTES 1 (H) 0.0 - 0.5 %    ABS.  NEUTROPHILS 7.9 1.8 - 8.0 K/UL    ABS. LYMPHOCYTES 2.7 0.8 - 3.5 K/UL    ABS. MONOCYTES 0.9 0.0 - 1.0 K/UL    ABS. EOSINOPHILS 0.2 0.0 - 0.4 K/UL    ABS. BASOPHILS 0.1 0.0 - 0.1 K/UL    ABS. IMM. GRANS. 0.1 (H) 0.00 - 0.04 K/UL    DF AUTOMATED     METABOLIC PANEL, COMPREHENSIVE   Result Value Ref Range    Sodium 138 136 - 145 mmol/L    Potassium 3.9 3.5 - 5.1 mmol/L    Chloride 104 97 - 108 mmol/L    CO2 26 21 - 32 mmol/L    Anion gap 8 5 - 15 mmol/L    Glucose 105 (H) 65 - 100 mg/dL    BUN 12 6 - 20 MG/DL    Creatinine 0.98 0.70 - 1.30 MG/DL    BUN/Creatinine ratio 12 12 - 20      GFR est AA >60 >60 ml/min/1.73m2    GFR est non-AA >60 >60 ml/min/1.73m2    Calcium 9.5 8.5 - 10.1 MG/DL    Bilirubin, total 0.3 0.2 - 1.0 MG/DL    ALT (SGPT) 109 (H) 12 - 78 U/L    AST (SGOT) 98 (H) 15 - 37 U/L    Alk.  phosphatase 66 45 - 117 U/L    Protein, total 7.7 6.4 - 8.2 g/dL    Albumin 3.2 (L) 3.5 - 5.0 g/dL    Globulin 4.5 (H) 2.0 - 4.0 g/dL    A-G Ratio 0.7 (L) 1.1 - 2.2     PROTHROMBIN TIME + INR   Result Value Ref Range    INR 1.1 0.9 - 1.1      Prothrombin time 11.6 (H) 9.0 - 11.1 sec   PTT   Result Value Ref Range    aPTT 28.2 22.1 - 32.0 sec    aPTT, therapeutic range     58.0 - 77.0 SECS   LACTIC ACID   Result Value Ref Range    Lactic acid 0.8 0.4 - 2.0 MMOL/L   URINALYSIS W/ REFLEX CULTURE   Result Value Ref Range    Color DARK YELLOW      Appearance CLEAR CLEAR      Specific gravity 1.027 1.003 - 1.030      pH (UA) 6.5 5.0 - 8.0      Protein NEGATIVE  NEG mg/dL    Glucose NEGATIVE  NEG mg/dL    Ketone NEGATIVE  NEG mg/dL    Bilirubin NEGATIVE  NEG      Blood NEGATIVE  NEG      Urobilinogen 0.2 0.2 - 1.0 EU/dL    Nitrites POSITIVE (A) NEG      Leukocyte Esterase NEGATIVE  NEG      WBC 0-4 0 - 4 /hpf    RBC 0-5 0 - 5 /hpf    Epithelial cells FEW FEW /lpf    Bacteria NEGATIVE  NEG /hpf    UA:UC IF INDICATED CULTURE NOT INDICATED BY UA RESULT CNI      Mucus 2+ (A) NEG /lpf   CK   Result Value Ref Range    CK 55 39 - 308 U/L   HEPATITIS PANEL, ACUTE   Result Value Ref Range    Hepatitis A, IgM NONREACTIVE NR      __          Hepatitis B surface Ag <0.10 Index    Hep B surface Ag Interp. NEGATIVE  NEG      __          Hepatitis B core, IgM NONREACTIVE NR      __          Hep C  virus Ab Interp. NONREACTIVE NR      Hep C  virus Ab comment Method used is Siemens [email or web address]     MONONUCLEOSIS SCREEN   Result Value Ref Range    Mononucleosis screen NEGATIVE  NEG     DODIE, DIRECT, W/REFLEX   Result Value Ref Range    DODIE, Direct NEGATIVE  NEGATIVE     ANCA PANEL   Result Value Ref Range    Myeloperoxidase (MPO) Ab <9.0 0.0 - 9.0 U/mL    Proteinase 3 (MD-3) Ab <3.5 0.0 - 3.5 U/mL    Cytoplasmic (C-ANCA) Ab <1:20 Neg:<1:20 titer    Perinuclear (P-ANCA) <1:20 Neg:<1:20 titer    Atypical pANCA 1:40 Neg:<1:20 titer   SED RATE (ESR)   Result Value Ref Range    Sed rate, automated 23 (H) 0 - 15 mm/hr   C REACTIVE PROTEIN, QT   Result Value Ref Range    C-Reactive protein 2.86 (H) 0.00 - 0.60 mg/dL     Assessment/Plan:  Diagnoses and all orders for this visit:    Encounter for medical examination to establish care  -     METABOLIC PANEL, COMPREHENSIVE  -     CBC WITH AUTOMATED DIFF    Screening cholesterol level  -     LIPID PANEL    Screening for thyroid disorder  -     TSH 3RD GENERATION    Screening for diabetes mellitus (DM)  -     HEMOGLOBIN A1C WITH EAG    Vitamin D deficiency  -     VITAMIN D, 25 HYDROXY    Frequency of urination  -     URINALYSIS W/ RFLX MICROSCOPIC      Patient Instructions        Learning About Colitis  What is colitis? Colitis is swelling (inflammation) of the colon. The colon makes up most of the large intestine. Many conditions can cause colitis. What are some types of colitis? · Infectious colitis is a type that appears suddenly. It's caused by a bacteria or virus, such as salmonella, shigella, or campylobacter. · Ischemic colitis is caused by problems with blood flow to the colon. This can happen after surgery.  It can also be caused by other health problems. · Microscopic colitis doesn't always have a clear cause. It can only be found with special tests. · Crohn's disease and ulcerative colitis are lifelong diseases. They can cause swelling, inflammation, and deep sores in the lining of the digestive tract. What are the symptoms? Symptoms may include fever, diarrhea that may be bloody, or belly pain. You may also have an urgent need to move your bowels or pain when you move your bowels. Or you may have bleeding from the rectum or weight loss. Your symptoms may depend on the type of colitis you have. For example, microscopic colitis may cause watery diarrhea. Sometimes symptoms go away on their own. If they don't go away, or if you have bleeding or severe pain, call your doctor right away. How is it diagnosed? You may need blood tests or a stool test. You also may need imaging tests like a CT scan. You may have a colonoscopy so that a doctor can look inside your colon. In some cases, the doctor may want to test a sample of tissue from the intestine. This test is called a biopsy. How is it treated? Treatment for colitis depends on the condition that is causing it. · Antibiotics may be used to treat an infection. · Diet changes may help with symptoms. · Medicines can also help to relieve inflammation and control symptoms. · In some cases, surgery to remove parts of the intestine may be needed. Follow-up care is a key part of your treatment and safety. Be sure to make and go to all appointments, and call your doctor if you are having problems. It's also a good idea to know your test results and keep a list of the medicines you take. Where can you learn more? Go to http://neeraj-enid.info/. Enter C130 in the search box to learn more about \"Learning About Colitis. \"  Current as of: November 7, 2018  Content Version: 12.2  © 4107-7643 [employer].  Care instructions adapted under license by [employer] (which disclaims liability or warranty for this information). If you have questions about a medical condition or this instruction, always ask your healthcare professional. [place] [address] any warranty or liability for your use of this information. Follow-up and Dispositions    · Return 1-2 weeks, for routine follow up.

## 2020-02-27 LAB
25(OH)D3+25(OH)D2 SERPL-MCNC: 29.5 NG/ML (ref 30–100)
ALBUMIN SERPL-MCNC: 4.7 G/DL (ref 4.1–5.2)
ALBUMIN/GLOB SERPL: 1.6 {RATIO} (ref 1.2–2.2)
ALP SERPL-CCNC: 70 IU/L (ref 39–117)
ALT SERPL-CCNC: 40 IU/L (ref 0–44)
APPEARANCE UR: CLEAR
AST SERPL-CCNC: 23 IU/L (ref 0–40)
BASOPHILS # BLD AUTO: 0.1 X10E3/UL (ref 0–0.2)
BASOPHILS NFR BLD AUTO: 1 %
BILIRUB SERPL-MCNC: 0.5 MG/DL (ref 0–1.2)
BILIRUB UR QL STRIP: NEGATIVE
BUN SERPL-MCNC: 14 MG/DL (ref 6–20)
BUN/CREAT SERPL: 14 (ref 9–20)
CALCIUM SERPL-MCNC: 9.9 MG/DL (ref 8.7–10.2)
CHLORIDE SERPL-SCNC: 101 MMOL/L (ref 96–106)
CHOLEST SERPL-MCNC: 173 MG/DL (ref 100–199)
CO2 SERPL-SCNC: 24 MMOL/L (ref 20–29)
COLOR UR: YELLOW
CREAT SERPL-MCNC: 1.01 MG/DL (ref 0.76–1.27)
EOSINOPHIL # BLD AUTO: 0.1 X10E3/UL (ref 0–0.4)
EOSINOPHIL NFR BLD AUTO: 1 %
ERYTHROCYTE [DISTWIDTH] IN BLOOD BY AUTOMATED COUNT: 13.3 % (ref 11.6–15.4)
EST. AVERAGE GLUCOSE BLD GHB EST-MCNC: 97 MG/DL
GLOBULIN SER CALC-MCNC: 2.9 G/DL (ref 1.5–4.5)
GLUCOSE SERPL-MCNC: 78 MG/DL (ref 65–99)
GLUCOSE UR QL: NEGATIVE
HBA1C MFR BLD: 5 % (ref 4.8–5.6)
HCT VFR BLD AUTO: 45.1 % (ref 37.5–51)
HDLC SERPL-MCNC: 56 MG/DL
HGB BLD-MCNC: 15.7 G/DL (ref 13–17.7)
HGB UR QL STRIP: NEGATIVE
IMM GRANULOCYTES # BLD AUTO: 0 X10E3/UL (ref 0–0.1)
IMM GRANULOCYTES NFR BLD AUTO: 0 %
KETONES UR QL STRIP: NEGATIVE
LDLC SERPL CALC-MCNC: 105 MG/DL (ref 0–99)
LEUKOCYTE ESTERASE UR QL STRIP: NEGATIVE
LYMPHOCYTES # BLD AUTO: 2.8 X10E3/UL (ref 0.7–3.1)
LYMPHOCYTES NFR BLD AUTO: 25 %
MCH RBC QN AUTO: 28.9 PG (ref 26.6–33)
MCHC RBC AUTO-ENTMCNC: 34.8 G/DL (ref 31.5–35.7)
MCV RBC AUTO: 83 FL (ref 79–97)
MICRO URNS: NORMAL
MONOCYTES # BLD AUTO: 0.8 X10E3/UL (ref 0.1–0.9)
MONOCYTES NFR BLD AUTO: 7 %
NEUTROPHILS # BLD AUTO: 7.5 X10E3/UL (ref 1.4–7)
NEUTROPHILS NFR BLD AUTO: 66 %
NITRITE UR QL STRIP: NEGATIVE
PH UR STRIP: 6 [PH] (ref 5–7.5)
PLATELET # BLD AUTO: 296 X10E3/UL (ref 150–450)
POTASSIUM SERPL-SCNC: 4.5 MMOL/L (ref 3.5–5.2)
PROT SERPL-MCNC: 7.6 G/DL (ref 6–8.5)
PROT UR QL STRIP: NEGATIVE
RBC # BLD AUTO: 5.43 X10E6/UL (ref 4.14–5.8)
SODIUM SERPL-SCNC: 141 MMOL/L (ref 134–144)
SP GR UR: 1.03 (ref 1–1.03)
TRIGL SERPL-MCNC: 58 MG/DL (ref 0–149)
TSH SERPL DL<=0.005 MIU/L-ACNC: 1.88 UIU/ML (ref 0.45–4.5)
UROBILINOGEN UR STRIP-MCNC: 0.2 MG/DL (ref 0.2–1)
VLDLC SERPL CALC-MCNC: 12 MG/DL (ref 5–40)
WBC # BLD AUTO: 11.4 X10E3/UL (ref 3.4–10.8)

## 2020-03-12 ENCOUNTER — OFFICE VISIT (OUTPATIENT)
Dept: FAMILY MEDICINE CLINIC | Age: 25
End: 2020-03-12

## 2020-03-12 VITALS
TEMPERATURE: 97.4 F | DIASTOLIC BLOOD PRESSURE: 75 MMHG | OXYGEN SATURATION: 99 % | RESPIRATION RATE: 20 BRPM | HEIGHT: 70 IN | HEART RATE: 91 BPM | BODY MASS INDEX: 23.05 KG/M2 | SYSTOLIC BLOOD PRESSURE: 121 MMHG | WEIGHT: 161 LBS

## 2020-03-12 DIAGNOSIS — E78.5 DYSLIPIDEMIA (HIGH LDL; LOW HDL): ICD-10-CM

## 2020-03-12 DIAGNOSIS — D72.828 OTHER ELEVATED WHITE BLOOD CELL (WBC) COUNT: ICD-10-CM

## 2020-03-12 DIAGNOSIS — E55.9 VITAMIN D DEFICIENCY: Primary | ICD-10-CM

## 2020-03-12 RX ORDER — CHOLECALCIFEROL (VITAMIN D3) 125 MCG
5000 CAPSULE ORAL
Qty: 100 CAP | Refills: 0 | Status: SHIPPED | OUTPATIENT
Start: 2020-03-13 | End: 2020-09-15 | Stop reason: SDUPTHER

## 2020-03-12 NOTE — PATIENT INSTRUCTIONS
Learning About Vitamin D  Why is it important to get enough vitamin D? Your body needs vitamin D to absorb calcium. Calcium keeps your bones and muscles, including your heart, healthy and strong. If your muscles don't get enough calcium, they can cramp, hurt, or feel weak. You may have long-term (chronic) muscle aches and pains. If you don't get enough vitamin D throughout life, you have an increased chance of having thin and brittle bones (osteoporosis) in your later years. Children who don't get enough vitamin D may not grow as much as others their age. They also have a chance of getting a rare disease called rickets. It causes weak bones. Vitamin D and calcium are added to many foods. And your body uses sunshine to make its own vitamin D. How much vitamin D do you need? The [employer] recommends that people ages 3 through 79 get 600 IU (international units) every day. Adults 71 and older need 800 IU every day. Blood tests for vitamin D can check your vitamin D level. But there is no standard normal range used by all laboratories. You're likely getting enough vitamin D if your levels are in the range of 20 to 50 ng/mL. How can you get more vitamin D? Foods that contain vitamin D include:  · [place], tuna, and mackerel. These are some of the best foods to eat when you need to get more vitamin D.  · Cheese, egg yolks, and beef liver. These foods have vitamin D in small amounts. · Milk, soy drinks, orange juice, yogurt, margarine, and some kinds of cereal have vitamin D added to them. Some people don't make vitamin D as well as others. They may have to take extra care in getting enough vitamin D. Things that reduce how much vitamin D your body makes include:  · Dark skin, such as many  Americans have. · Age, especially if you are older than 72. · Digestive problems, such as Crohn's or celiac disease. · Liver and kidney disease.   Some people who do not get enough vitamin D may need supplements. Are there any risks from taking vitamin D?  · Too much vitamin D:  ? Can damage your kidneys. ? Can cause nausea and vomiting, constipation, and weakness. ? Raises the amount of calcium in your blood. If this happens, you can get confused or have an irregular heart rhythm. · Vitamin D may interact with other medicines. Tell your doctor about all of the medicines you take, including over-the-counter drugs, herbs, and pills. Tell your doctor about all of your current medical problems. Where can you learn more? Go to http://neeraj-enid.info/  Enter V530 in the search box to learn more about \"Learning About Vitamin D.\"  Current as of: August 21, 2019Content Version: 12.4  © 1181-5982 [employer]. Care instructions adapted under license by [employer] (which disclaims liability or warranty for this information). If you have questions about a medical condition or this instruction, always ask your healthcare professional. [place] [address] any warranty or liability for your use of this information.

## 2020-03-12 NOTE — PROGRESS NOTES
Liver and kidney functions are within normal limits. WBC is up. LDL is slightly up, vit D is slightly low.   Thyroid function test is normal. A1C is normal.  Will start  Vit d supplement

## 2020-03-12 NOTE — LETTER
3/12/2020 Mr. Shakira Tineo Page [address] [facility] [address] [place] [address] Dear Shakira Tineo Page: 
 
Please find your most recent results below. Liver and kidney functions are within normal limits. WBC is up. LDL is slightly up, vit D is slightly low. Thyroid function test is normal. A1C is normal. 
 
Resulted Orders METABOLIC PANEL, COMPREHENSIVE (Collected: 2/26/2020  1:26 PM) Result Value Ref Range Glucose 78 65 - 99 mg/dL BUN 14 6 - 20 mg/dL Creatinine 1.01 0.76 - 1.27 mg/dL GFR est non- >59 mL/min/1.73 GFR est  >59 mL/min/1.73  
 BUN/Creatinine ratio 14 9 - 20 Sodium 141 134 - 144 mmol/L Potassium 4.5 3.5 - 5.2 mmol/L Chloride 101 96 - 106 mmol/L  
 CO2 24 20 - 29 mmol/L Calcium 9.9 8.7 - 10.2 mg/dL Protein, total 7.6 6.0 - 8.5 g/dL Albumin 4.7 4.1 - 5.2 g/dL Comment: **Please note reference interval change** GLOBULIN, TOTAL 2.9 1.5 - 4.5 g/dL A-G Ratio 1.6 1.2 - 2.2 Bilirubin, total 0.5 0.0 - 1.2 mg/dL Alk. phosphatase 70 39 - 117 IU/L  
 AST (SGOT) 23 0 - 40 IU/L  
 ALT (SGPT) 40 0 - 44 IU/L Narrative Performed at:  [address]  [id number] : Erika Escalera MD, Phone:  [phone] CBC WITH AUTOMATED DIFF (Collected: 2/26/2020  1:26 PM) Result Value Ref Range WBC 11.4 (H) 3.4 - 10.8 x10E3/uL  
 RBC 5.43 4.14 - 5.80 x10E6/uL HGB 15.7 13.0 - 17.7 g/dL HCT 45.1 37.5 - 51.0 % MCV 83 79 - 97 fL  
 MCH 28.9 26.6 - 33.0 pg  
 MCHC 34.8 31.5 - 35.7 g/dL  
 RDW 13.3 11.6 - 15.4 % PLATELET 053 085 - 369 x10E3/uL NEUTROPHILS 66 Not Estab. % Lymphocytes 25 Not Estab. % MONOCYTES 7 Not Estab. % EOSINOPHILS 1 Not Estab. % BASOPHILS 1 Not Estab. %  
 ABS. NEUTROPHILS 7.5 (H) 1.4 - 7.0 x10E3/uL Abs Lymphocytes 2.8 0.7 - 3.1 x10E3/uL  
 ABS. MONOCYTES 0.8 0.1 - 0.9 x10E3/uL  
 ABS. EOSINOPHILS 0.1 0.0 - 0.4 x10E3/uL ABS. BASOPHILS 0.1 0.0 - 0.2 x10E3/uL IMMATURE GRANULOCYTES 0 Not Estab. %  
 ABS. IMM. GRANS. 0.0 0.0 - 0.1 x10E3/uL Narrative Performed at:  [address]  [id number] : Gale Ramírez MD, Phone:  [phone] LIPID PANEL (Collected: 2/26/2020  1:26 PM) Result Value Ref Range Cholesterol, total 173 100 - 199 mg/dL Triglyceride 58 0 - 149 mg/dL HDL Cholesterol 56 >39 mg/dL VLDL, calculated 12 5 - 40 mg/dL LDL, calculated 105 (H) 0 - 99 mg/dL Narrative Performed at:  [address]  [id number] : Gale Ramírez MD, Phone:  [phone] HEMOGLOBIN A1C WITH EAG (Collected: 2/26/2020  1:26 PM) Result Value Ref Range Hemoglobin A1c 5.0 4.8 - 5.6 % Comment:  
            Prediabetes: 5.7 - 6.4 Diabetes: >6.4 Glycemic control for adults with diabetes: <7.0 Estimated average glucose 97 mg/dL Narrative Performed at:  [address]  [id number] : Gale Ramírez MD, Phone:  [phone] TSH 3RD GENERATION (Collected: 2/26/2020  1:26 PM) Result Value Ref Range TSH 1.880 0.450 - 4.500 uIU/mL Narrative Performed at:  [address]  [id number] : Gale Ramírez MD, Phone:  [phone] VITAMIN D, 25 HYDROXY (Collected: 2/26/2020  1:26 PM) Result Value Ref Range VITAMIN D, 25-HYDROXY 29.5 (L) 30.0 - 100.0 ng/mL Comment:  
   Vitamin D deficiency has been defined by the [facility] practice guideline as a 
level of serum 25-OH vitamin D less than 20 ng/mL (1,2). The Endocrine Society went on to further define vitamin D 
insufficiency as a level between 21 and 29 ng/mL (2). 1. IOM ([place] of Medicine). 2010. Dietary reference 
   intakes for calcium and D. [facility]:  The 
 [employer]. 2. Daniel MF, Mira NC, Andreina TUCKER, et al. 
   Evaluation, treatment, and prevention of vitamin D 
   deficiency: an Endocrine Society clinical practice 
   guideline. JCEM. 2011 Jul; 96(7):1911-30. Narrative Performed at:  [address]  [id number] : Renee Smith MD, Phone:  [phone] URINALYSIS W/ RFLX MICROSCOPIC (Collected: 2/26/2020  1:26 PM) Result Value Ref Range Specific Gravity 1.028 1.005 - 1.030  
 pH (UA) 6.0 5.0 - 7.5 Color Yellow Yellow Appearance Clear Clear Leukocyte Esterase Negative Negative Protein Negative Negative/Trace Glucose Negative Negative Ketone Negative Negative Blood Negative Negative Bilirubin Negative Negative Urobilinogen 0.2 0.2 - 1.0 mg/dL Nitrites Negative Negative Microscopic Examination Comment Comment:  
   Microscopic not indicated and not performed. Narrative Performed at:  [address]  [id number] : Renee Smith MD, Phone:  [phone] RECOMMENDATIONS: 
Work on diet and exercise. Please fill the enclosed prescription for vit D is in pharmacy Please call me if you have any questions: [phone] Sincerely, Renetta Foote MD

## 2020-03-12 NOTE — PROGRESS NOTES
Chief Complaint   Patient presents with    Results     2 week     HPI:  Milagros Caro is a 31 y.o.  male presents for 2 week follow up on results. Liver and kidney functions are within normal limits. WBC is up. LDL is slightly up, vit D is slightly low. Thyroid function test is normal. A1C is normal. Will start  Vit d supplement. Diet and exercise have been encouraged. Review of Systems  As per hpi    Past Medical History:   Diagnosis Date    Gastrointestinal disorder      No past surgical history on file. Social History     Socioeconomic History    Marital status:      Spouse name: Not on file    Number of children: Not on file    Years of education: Not on file    Highest education level: Not on file   Tobacco Use    Smoking status: Never Smoker    Smokeless tobacco: Never Used   Substance and Sexual Activity    Alcohol use: Yes     Comment: occ    Drug use: Never     No family history on file.   Current Outpatient Medications   Medication Sig Dispense Refill    HUMIRA,CF, PEN 40 mg/0.4 mL injection pen        No Known Allergies    Objective:  Visit Vitals  /75   Pulse 91   Temp 97.4 °F (36.3 °C) (Oral)   Resp 20   Ht 5' 10\" (1.778 m)   Wt 161 lb (73 kg)   SpO2 99%   BMI 23.10 kg/m²     Physical Exam:   General appearance - alert, well appearing in no distress  Mental status - alert, oriented to person, place, and time  Chest - clear to auscultation, no wheezes, rales or rhonchi  Heart - normal rate, regular rhythm, normal blood pressure  Abdomen - soft, nontender, nondistended, no organomegaly  Ext-peripheral pulses normal, no pedal edema  Neuro -alert, oriented, normal speech, no focal findings   Back-full range of motion, no tenderness, palpable spasm or pain on motion     Results for orders placed or performed in visit on 44/14/74   METABOLIC PANEL, COMPREHENSIVE   Result Value Ref Range    Glucose 78 65 - 99 mg/dL    BUN 14 6 - 20 mg/dL    Creatinine 1.01 0.76 - 1.27 mg/dL    GFR est non- >59 mL/min/1.73    GFR est  >59 mL/min/1.73    BUN/Creatinine ratio 14 9 - 20    Sodium 141 134 - 144 mmol/L    Potassium 4.5 3.5 - 5.2 mmol/L    Chloride 101 96 - 106 mmol/L    CO2 24 20 - 29 mmol/L    Calcium 9.9 8.7 - 10.2 mg/dL    Protein, total 7.6 6.0 - 8.5 g/dL    Albumin 4.7 4.1 - 5.2 g/dL    GLOBULIN, TOTAL 2.9 1.5 - 4.5 g/dL    A-G Ratio 1.6 1.2 - 2.2    Bilirubin, total 0.5 0.0 - 1.2 mg/dL    Alk. phosphatase 70 39 - 117 IU/L    AST (SGOT) 23 0 - 40 IU/L    ALT (SGPT) 40 0 - 44 IU/L   CBC WITH AUTOMATED DIFF   Result Value Ref Range    WBC 11.4 (H) 3.4 - 10.8 x10E3/uL    RBC 5.43 4.14 - 5.80 x10E6/uL    HGB 15.7 13.0 - 17.7 g/dL    HCT 45.1 37.5 - 51.0 %    MCV 83 79 - 97 fL    MCH 28.9 26.6 - 33.0 pg    MCHC 34.8 31.5 - 35.7 g/dL    RDW 13.3 11.6 - 15.4 %    PLATELET 423 219 - 770 x10E3/uL    NEUTROPHILS 66 Not Estab. %    Lymphocytes 25 Not Estab. %    MONOCYTES 7 Not Estab. %    EOSINOPHILS 1 Not Estab. %    BASOPHILS 1 Not Estab. %    ABS. NEUTROPHILS 7.5 (H) 1.4 - 7.0 x10E3/uL    Abs Lymphocytes 2.8 0.7 - 3.1 x10E3/uL    ABS. MONOCYTES 0.8 0.1 - 0.9 x10E3/uL    ABS. EOSINOPHILS 0.1 0.0 - 0.4 x10E3/uL    ABS. BASOPHILS 0.1 0.0 - 0.2 x10E3/uL    IMMATURE GRANULOCYTES 0 Not Estab. %    ABS. IMM.  GRANS. 0.0 0.0 - 0.1 x10E3/uL   LIPID PANEL   Result Value Ref Range    Cholesterol, total 173 100 - 199 mg/dL    Triglyceride 58 0 - 149 mg/dL    HDL Cholesterol 56 >39 mg/dL    VLDL, calculated 12 5 - 40 mg/dL    LDL, calculated 105 (H) 0 - 99 mg/dL   HEMOGLOBIN A1C WITH EAG   Result Value Ref Range    Hemoglobin A1c 5.0 4.8 - 5.6 %    Estimated average glucose 97 mg/dL   TSH 3RD GENERATION   Result Value Ref Range    TSH 1.880 0.450 - 4.500 uIU/mL   VITAMIN D, 25 HYDROXY   Result Value Ref Range    VITAMIN D, 25-HYDROXY 29.5 (L) 30.0 - 100.0 ng/mL   URINALYSIS W/ RFLX MICROSCOPIC   Result Value Ref Range    Specific Gravity 1.028 1.005 - 1.030    pH (UA) 6.0 5.0 - 7.5 Color Yellow Yellow    Appearance Clear Clear    Leukocyte Esterase Negative Negative    Protein Negative Negative/Trace    Glucose Negative Negative    Ketone Negative Negative    Blood Negative Negative    Bilirubin Negative Negative    Urobilinogen 0.2 0.2 - 1.0 mg/dL    Nitrites Negative Negative    Microscopic Examination Comment      Assessment/Plan:  Diagnoses and all orders for this visit:    Vitamin D deficiency  -     cholecalciferol (VITAMIN D3) (5000 Units /125 mcg) capsule; Take 1 Cap by mouth every Monday, Wednesday, Friday., Normal, Disp-100 Cap, R-0    Other elevated white blood cell (WBC) count    Dyslipidemia (high LDL; low HDL)      Patient Instructions        Learning About Vitamin D  Why is it important to get enough vitamin D? Your body needs vitamin D to absorb calcium. Calcium keeps your bones and muscles, including your heart, healthy and strong. If your muscles don't get enough calcium, they can cramp, hurt, or feel weak. You may have long-term (chronic) muscle aches and pains. If you don't get enough vitamin D throughout life, you have an increased chance of having thin and brittle bones (osteoporosis) in your later years. Children who don't get enough vitamin D may not grow as much as others their age. They also have a chance of getting a rare disease called rickets. It causes weak bones. Vitamin D and calcium are added to many foods. And your body uses sunshine to make its own vitamin D. How much vitamin D do you need? The [place] of Medicine recommends that people ages 3 through 79 get 600 IU (international units) every day. Adults 71 and older need 800 IU every day. Blood tests for vitamin D can check your vitamin D level. But there is no standard normal range used by all laboratories. You're likely getting enough vitamin D if your levels are in the range of 20 to 50 ng/mL. How can you get more vitamin D? Foods that contain vitamin D include:  · Pooler, tuna, and mackerel. These are some of the best foods to eat when you need to get more vitamin D.  · Cheese, egg yolks, and beef liver. These foods have vitamin D in small amounts. · Milk, soy drinks, orange juice, yogurt, margarine, and some kinds of cereal have vitamin D added to them. Some people don't make vitamin D as well as others. They may have to take extra care in getting enough vitamin D. Things that reduce how much vitamin D your body makes include:  · Dark skin, such as many  Americans have. · Age, especially if you are older than 72. · Digestive problems, such as Crohn's or celiac disease. · Liver and kidney disease. Some people who do not get enough vitamin D may need supplements. Are there any risks from taking vitamin D?  · Too much vitamin D:  ? Can damage your kidneys. ? Can cause nausea and vomiting, constipation, and weakness. ? Raises the amount of calcium in your blood. If this happens, you can get confused or have an irregular heart rhythm. · Vitamin D may interact with other medicines. Tell your doctor about all of the medicines you take, including over-the-counter drugs, herbs, and pills. Tell your doctor about all of your current medical problems. Where can you learn more? Go to http://neeraj-enid.info/  Enter V530 in the search box to learn more about \"Learning About Vitamin D.\"  Current as of: August 21, 2019Content Version: 12.4  © 5208-2055 Healthwise, Incorporated. Care instructions adapted under license by [employer] (which disclaims liability or warranty for this information). If you have questions about a medical condition or this instruction, always ask your healthcare professional. [place] [address] any warranty or liability for your use of this information. Follow-up and Dispositions    · Return in about 6 months (around 9/12/2020), or if symptoms worsen or fail to improve, for routine follow up.

## 2020-04-08 ENCOUNTER — TELEPHONE (OUTPATIENT)
Dept: FAMILY MEDICINE CLINIC | Age: 25
End: 2020-04-08

## 2020-04-08 NOTE — TELEPHONE ENCOUNTER
Pt reports pain pressure in legs and red spots. Same he had in January.     He would like same medication called in (steroid)       Best number to reach him is [phone]

## 2020-04-13 ENCOUNTER — TELEPHONE (OUTPATIENT)
Dept: FAMILY MEDICINE CLINIC | Age: 25
End: 2020-04-13

## 2020-04-13 RX ORDER — PREDNISONE 10 MG/1
TABLET ORAL
Qty: 21 TAB | Refills: 0 | OUTPATIENT
Start: 2020-04-13

## 2020-04-13 NOTE — TELEPHONE ENCOUNTER
----- Message from Jeovany Baca sent at 4/13/2020 12:17 PM EDT -----  Regarding: Dr. Acosta Cowartr: [phone]  Caller's first and last name and relationship (if not the patient): Heidy Prieto, Wife  Best contact number(s): [phone]  What are the symptoms: Red spots, rashes, and pain down legs, swelling in joints going on since 4/8/2020. Transfer successful - yes/no (include outcome): N/A  Transfer declined - yes/no (include reason): Yes, PSR advised to send a message  Was caller advised to seek appropriate level of care - yes/no:  Yes  Details to clarify the request: Caller stated this is the 4th time calling in regards to this situation since 4/8/20 and she has not heard back.

## 2020-04-13 NOTE — TELEPHONE ENCOUNTER
----- Message from Kayleigh Rivera sent at 4/13/2020  8:26 AM EDT -----  Regarding: Dr. Rosi Medel telephone  Contact: [phone]  Caller's first and last name and relationship (if not the patient): Heidy Ida  Best contact number(s): [phone]  What are the symptoms: Red rash, swelling in joints, painful to walk  Transfer successful - yes/no (include outcome): no  Transfer declined - yes/no (include reason): no  Was caller advised to seek appropriate level of care - yes/no:  yes  Details to clarify the request:  Advised he had called Wednesday- Friday to get treatment for his symptoms and has yet to get response. Would like to see if he can go back on steroids until he can come into the practice again.

## 2020-04-13 NOTE — TELEPHONE ENCOUNTER
----- Message from Navin Warren sent at 4/13/2020  8:26 AM EDT -----  Regarding: Dr. Jessica Quarles telephone  Contact: [phone]  Caller's first and last name and relationship (if not the patient): Heidy Prieto  Best contact number(s): [phone]  What are the symptoms: Red rash, swelling in joints, painful to walk  Transfer successful - yes/no (include outcome): no  Transfer declined - yes/no (include reason): no  Was caller advised to seek appropriate level of care - yes/no:  yes  Details to clarify the request:  Advised he had called Wednesday- Friday to get treatment for his symptoms and has yet to get response. Would like to see if he can go back on steroids until he can come into the practice again.

## 2020-04-13 NOTE — TELEPHONE ENCOUNTER
Call spoke with patient's wife regarding her  is having serve pain in joint and requesting the prednisone medication Last office Visit 3/12/2020

## 2020-04-15 ENCOUNTER — VIRTUAL VISIT (OUTPATIENT)
Dept: FAMILY MEDICINE CLINIC | Age: 25
End: 2020-04-15

## 2020-04-15 VITALS — TEMPERATURE: 97.7 F

## 2020-04-15 DIAGNOSIS — L30.9 DERMATITIS: Primary | ICD-10-CM

## 2020-04-15 DIAGNOSIS — M79.10 MYALGIA: ICD-10-CM

## 2020-04-15 RX ORDER — PREDNISONE 20 MG/1
TABLET ORAL
Qty: 13 TAB | Refills: 0 | Status: SHIPPED | OUTPATIENT
Start: 2020-04-15 | End: 2020-04-28 | Stop reason: SDUPTHER

## 2020-04-15 NOTE — PROGRESS NOTES
Consent: Jill Orr, who was seen by synchronous (real-time) audio-video technology, and/or his healthcare decision maker, is aware that this patient-initiated, Telehealth encounter on 4/15/2020 is a billable service, with coverage as determined by his insurance carrier. He is aware that he may receive a bill and has provided verbal consent to proceed: Yes. Assessment & Plan:   Diagnoses and all orders for this visit:    1. Dermatitis  2. Myalgia  Unclear etiology with no specific new exposures. Patient even denies tick bites. Work-up from hospitalization was reviewed with extensive lab testing as well as LP. Will use another round of prednisone but if symptoms start flaring back up again, patient should check in with an allergist for allergy testing  -     predniSONE (DELTASONE) 20 mg tablet; Take 3 pills for 2 days, then 2 pills for 2 days, then 1 pill for 3 days      I spent at least 15 minutes with this established patient, and >50% of the time was spent counseling and/or coordinating care regarding Dermatitis and myalgia  712  Subjective:   Jill Orr is a 31 y.o. male who was seen for Joint Pain (and recurrent red spots like in January)    Red spots on left leg and sig pain. Sx x 1 week. No new exposures. Better with rest.  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 Specifically denies any exposures to new medicines, new foods, changes in soap or detergent, new clothes, or insect bites. Hx of Colitis and concern for Crohn's. Had colonoscopy with Dr. Joseph Strange. Went on Cipro and Flagyl and then had rxn to this with pain and red spots on legs back in 1/2020. He ended up being hospitalized and was taken off Humira at that time. Doing well after this. Prior to Admission medications    Medication Sig Start Date End Date Taking?  Authorizing Provider   predniSONE (DELTASONE) 20 mg tablet Take 3 pills for 2 days, then 2 pills for 2 days, then 1 pill for 3 days 4/15/20  Yes Kathi Sara Galeana MD   cholecalciferol (VITAMIN D3) (5000 Units /125 mcg) capsule Take 1 Cap by mouth every Monday, Wednesday, Friday. 3/13/20  Yes MD NARESH Braxton CF, PEN 40 mg/0.4 mL injection pen  1/3/20 4/15/20  Provider, Historical     No Known Allergies    Patient Active Problem List    Diagnosis Date Noted    Leg pain 01/29/2020     Past Medical History:   Diagnosis Date    Crohn's disease ([facility] [address] 2019    Gastrointestinal disorder        ROS        Objective:   Vital Signs: (As obtained by patient/caregiver at home)  Visit Vitals  Temp 97.7 °F (36.5 °C) (Oral)        Physical exam:  General appearance - alert, well appearing, and in no distress  Eyes -sclera anicteric, no discharge  HEENT normocephalic, atraumatic, moist mucous membranes, no visualized neck mass  Chest -normal respiratory effort, no visualized signs of respiratory distress  Neurological - alert, awake, normal speech, no focal findings or movement disorder noted  Psych - normal mood and affect  Skin very mild erythematous papules on left lower extremity      We discussed the expected course, resolution and complications of the diagnosis(es) in detail. Medication risks, benefits, costs, interactions, and alternatives were discussed as indicated. I advised him to contact the office if his condition worsens, changes or fails to improve as anticipated. He expressed understanding with the diagnosis(es) and plan. Molina Mcfarland is a 31 y.o. male being evaluated by a video visit encounter for concerns as above. A caregiver was present when appropriate. Due to this being a TeleHealth encounter (During DUIJV-14 public health emergency), evaluation of the following organ systems was limited: Vitals/Constitutional/EENT/Resp/CV/GI//MS/Neuro/Skin/Heme-Lymph-Imm.   Pursuant to the emergency declaration under the [facility] waiver authority and the [employer] Appropriations Act, this Virtual  Visit was conducted, with patient's (and/or legal guardian's) consent, to reduce the patient's risk of exposure to COVID-19 and provide necessary medical care. Services were provided through a video synchronous discussion virtually to substitute for in-person clinic visit. Patient and provider were located at their individual homes.         Ricardo Christian MD

## 2020-04-18 RX ORDER — PREDNISONE 10 MG/1
10 TABLET ORAL
Refills: 0 | OUTPATIENT
Start: 2020-04-18

## 2020-04-28 ENCOUNTER — VIRTUAL VISIT (OUTPATIENT)
Dept: FAMILY MEDICINE CLINIC | Age: 25
End: 2020-04-28

## 2020-04-28 DIAGNOSIS — I77.6 VASCULITIS (HCC): Primary | ICD-10-CM

## 2020-04-28 RX ORDER — CETIRIZINE HCL 10 MG
10 TABLET ORAL
Qty: 30 TAB | Refills: 3 | Status: SHIPPED | OUTPATIENT
Start: 2020-04-28 | End: 2020-05-12

## 2020-04-28 RX ORDER — NAPROXEN 500 MG/1
500 TABLET ORAL 2 TIMES DAILY WITH MEALS
Qty: 60 TAB | Refills: 0 | Status: SHIPPED | OUTPATIENT
Start: 2020-04-28 | End: 2020-05-12

## 2020-04-28 RX ORDER — METHYLPREDNISOLONE 4 MG/1
TABLET ORAL
Qty: 1 DOSE PACK | Refills: 0 | Status: SHIPPED | OUTPATIENT
Start: 2020-04-28 | End: 2020-05-12

## 2020-04-28 NOTE — PROGRESS NOTES
Chief Complaint   Patient presents with    Rash     HPI:  Holli Dejesus is a 31 y.o. male evaluated via telephone on 4/28/2020. Consent:  He is aware that he may receive a bill for this telephone service, depending on his insurance coverage, and has provided verbal consent to proceed: Yes    I affirm this is a Patient Initiated Episode with an Established Patient who has not had a related appointment within my department in the past 7 days or scheduled within the next 24 hours. Documentation:  Holli Dejesus is a 31 y.o.  male evaluated for recurrent red rash  Patient has been treated twice with steroid. The rash is located on LE, red, non pruritic  He denies fever/chills, swelling. Review of Systems  Constitutional: negative for fevers/chills  Respiratory:  negative for cough, dyspnea,wheezing  CV:   negative for chest pain, palpitations, lower extremity edema  GI:   negative for abdominal pain  Integument:  positive for rash without pruritus    Past Medical History:   Diagnosis Date    Crohn's disease ([facility] [address] 2019    Gastrointestinal disorder      No past surgical history on file. Social History     Socioeconomic History    Marital status:      Spouse name: Not on file    Number of children: Not on file    Years of education: Not on file    Highest education level: Not on file   Tobacco Use    Smoking status: Never Smoker    Smokeless tobacco: Never Used   Substance and Sexual Activity    Alcohol use: Yes     Comment: occ    Drug use: Never     No family history on file. Current Outpatient Medications   Medication Sig Dispense Refill    cholecalciferol (VITAMIN D3) (5000 Units /125 mcg) capsule Take 1 Cap by mouth every Monday, Wednesday, Friday. 100 Cap 0    predniSONE (DELTASONE) 20 mg tablet Take 3 pills for 2 days, then 2 pills for 2 days, then 1 pill for 3 days 13 Tab 0     No Known Allergies    Objective: There were no vitals taken for this visit.   Physical Exam: General appearance - alert, well appearing in no apparent distress  Mental status - alert, oriented to person, place, and time  Skin-red, petechia like lesion on LEs suspicious for vasculitis      Results for orders placed or performed in visit on 66/12/95   METABOLIC PANEL, COMPREHENSIVE   Result Value Ref Range    Glucose 78 65 - 99 mg/dL    BUN 14 6 - 20 mg/dL    Creatinine 1.01 0.76 - 1.27 mg/dL    GFR est non- >59 mL/min/1.73    GFR est  >59 mL/min/1.73    BUN/Creatinine ratio 14 9 - 20    Sodium 141 134 - 144 mmol/L    Potassium 4.5 3.5 - 5.2 mmol/L    Chloride 101 96 - 106 mmol/L    CO2 24 20 - 29 mmol/L    Calcium 9.9 8.7 - 10.2 mg/dL    Protein, total 7.6 6.0 - 8.5 g/dL    Albumin 4.7 4.1 - 5.2 g/dL    GLOBULIN, TOTAL 2.9 1.5 - 4.5 g/dL    A-G Ratio 1.6 1.2 - 2.2    Bilirubin, total 0.5 0.0 - 1.2 mg/dL    Alk. phosphatase 70 39 - 117 IU/L    AST (SGOT) 23 0 - 40 IU/L    ALT (SGPT) 40 0 - 44 IU/L   CBC WITH AUTOMATED DIFF   Result Value Ref Range    WBC 11.4 (H) 3.4 - 10.8 x10E3/uL    RBC 5.43 4.14 - 5.80 x10E6/uL    HGB 15.7 13.0 - 17.7 g/dL    HCT 45.1 37.5 - 51.0 %    MCV 83 79 - 97 fL    MCH 28.9 26.6 - 33.0 pg    MCHC 34.8 31.5 - 35.7 g/dL    RDW 13.3 11.6 - 15.4 %    PLATELET 273 546 - 708 x10E3/uL    NEUTROPHILS 66 Not Estab. %    Lymphocytes 25 Not Estab. %    MONOCYTES 7 Not Estab. %    EOSINOPHILS 1 Not Estab. %    BASOPHILS 1 Not Estab. %    ABS. NEUTROPHILS 7.5 (H) 1.4 - 7.0 x10E3/uL    Abs Lymphocytes 2.8 0.7 - 3.1 x10E3/uL    ABS. MONOCYTES 0.8 0.1 - 0.9 x10E3/uL    ABS. EOSINOPHILS 0.1 0.0 - 0.4 x10E3/uL    ABS. BASOPHILS 0.1 0.0 - 0.2 x10E3/uL    IMMATURE GRANULOCYTES 0 Not Estab. %    ABS. IMM.  GRANS. 0.0 0.0 - 0.1 x10E3/uL   LIPID PANEL   Result Value Ref Range    Cholesterol, total 173 100 - 199 mg/dL    Triglyceride 58 0 - 149 mg/dL    HDL Cholesterol 56 >39 mg/dL    VLDL, calculated 12 5 - 40 mg/dL    LDL, calculated 105 (H) 0 - 99 mg/dL   HEMOGLOBIN A1C WITH EAG   Result Value Ref Range    Hemoglobin A1c 5.0 4.8 - 5.6 %    Estimated average glucose 97 mg/dL   TSH 3RD GENERATION   Result Value Ref Range    TSH 1.880 0.450 - 4.500 uIU/mL   VITAMIN D, 25 HYDROXY   Result Value Ref Range    VITAMIN D, 25-HYDROXY 29.5 (L) 30.0 - 100.0 ng/mL   URINALYSIS W/ RFLX MICROSCOPIC   Result Value Ref Range    Specific Gravity 1.028 1.005 - 1.030    pH (UA) 6.0 5.0 - 7.5    Color Yellow Yellow    Appearance Clear Clear    Leukocyte Esterase Negative Negative    Protein Negative Negative/Trace    Glucose Negative Negative    Ketone Negative Negative    Blood Negative Negative    Bilirubin Negative Negative    Urobilinogen 0.2 0.2 - 1.0 mg/dL    Nitrites Negative Negative    Microscopic Examination Comment      Assessment/Plan:  Diagnoses and all orders for this visit:    Vasculitis (HCC)  -     methylPREDNISolone (Medrol, Ubaldo,) 4 mg tablet; Take as directed, Normal, Disp-1 Dose Pack, R-0  -     naproxen (NAPROSYN) 500 mg tablet; Take 1 Tab by mouth two (2) times daily (with meals). , Normal, Disp-60 Tab, R-0  -     cetirizine (ZYRTEC) 10 mg tablet; Take 1 Tab by mouth nightly., Normal, Disp-30 Tab, R-3  -     REFERRAL TO DERMATOLOGY      Follow-up and Dispositions    · Return in about 3 months (around 7/28/2020), or if symptoms worsen or fail to improve, for routine follow up.        Total Time: minutes: 5-10 minutes    Jacob Ham MD

## 2020-04-28 NOTE — PROGRESS NOTES
Chief Complaint   Patient presents with    Rash     Getting a red spot on both legs. Notice spots x1 month.

## 2020-05-12 ENCOUNTER — HOSPITAL ENCOUNTER (EMERGENCY)
Age: 25
Discharge: HOME | End: 2020-05-12
Attending: EMERGENCY MEDICINE
Payer: COMMERCIAL

## 2020-05-12 VITALS
OXYGEN SATURATION: 99 % | WEIGHT: 158.51 LBS | RESPIRATION RATE: 18 BRPM | TEMPERATURE: 97.7 F | DIASTOLIC BLOOD PRESSURE: 80 MMHG | SYSTOLIC BLOOD PRESSURE: 122 MMHG | BODY MASS INDEX: 22.69 KG/M2 | HEIGHT: 70 IN | HEART RATE: 88 BPM

## 2020-05-12 DIAGNOSIS — K50.10 CROHN'S DISEASE OF LARGE INTESTINE WITHOUT COMPLICATION (HCC): Primary | ICD-10-CM

## 2020-05-12 LAB
ALBUMIN SERPL-MCNC: 3.1 G/DL (ref 3.5–5)
ALBUMIN/GLOB SERPL: 0.7 {RATIO} (ref 1.1–2.2)
ALP SERPL-CCNC: 71 U/L (ref 45–117)
ALT SERPL-CCNC: 21 U/L (ref 12–78)
ANION GAP SERPL CALC-SCNC: 3 MMOL/L (ref 5–15)
APPEARANCE UR: ABNORMAL
AST SERPL-CCNC: 12 U/L (ref 15–37)
BACTERIA URNS QL MICRO: NEGATIVE /HPF
BASOPHILS # BLD: 0.1 K/UL (ref 0–0.1)
BASOPHILS NFR BLD: 0 % (ref 0–1)
BILIRUB SERPL-MCNC: 0.3 MG/DL (ref 0.2–1)
BILIRUB UR QL: NEGATIVE
BUN SERPL-MCNC: 18 MG/DL (ref 6–20)
BUN/CREAT SERPL: 16 (ref 12–20)
CALCIUM SERPL-MCNC: 8.9 MG/DL (ref 8.5–10.1)
CHLORIDE SERPL-SCNC: 107 MMOL/L (ref 97–108)
CO2 SERPL-SCNC: 29 MMOL/L (ref 21–32)
COLOR UR: ABNORMAL
CREAT SERPL-MCNC: 1.14 MG/DL (ref 0.7–1.3)
DIFFERENTIAL METHOD BLD: ABNORMAL
EOSINOPHIL # BLD: 0.2 K/UL (ref 0–0.4)
EOSINOPHIL NFR BLD: 2 % (ref 0–7)
EPITH CASTS URNS QL MICRO: ABNORMAL /LPF
ERYTHROCYTE [DISTWIDTH] IN BLOOD BY AUTOMATED COUNT: 12.8 % (ref 11.5–14.5)
GLOBULIN SER CALC-MCNC: 4.2 G/DL (ref 2–4)
GLUCOSE SERPL-MCNC: 92 MG/DL (ref 65–100)
GLUCOSE UR STRIP.AUTO-MCNC: NEGATIVE MG/DL
HCT VFR BLD AUTO: 43.9 % (ref 36.6–50.3)
HGB BLD-MCNC: 15.2 G/DL (ref 12.1–17)
HGB UR QL STRIP: ABNORMAL
HYALINE CASTS URNS QL MICRO: ABNORMAL /LPF (ref 0–5)
IMM GRANULOCYTES # BLD AUTO: 0.1 K/UL (ref 0–0.04)
IMM GRANULOCYTES NFR BLD AUTO: 1 % (ref 0–0.5)
KETONES UR QL STRIP.AUTO: NEGATIVE MG/DL
LEUKOCYTE ESTERASE UR QL STRIP.AUTO: NEGATIVE
LYMPHOCYTES # BLD: 2 K/UL (ref 0.8–3.5)
LYMPHOCYTES NFR BLD: 18 % (ref 12–49)
MCH RBC QN AUTO: 28.8 PG (ref 26–34)
MCHC RBC AUTO-ENTMCNC: 34.6 G/DL (ref 30–36.5)
MCV RBC AUTO: 83.3 FL (ref 80–99)
MONOCYTES # BLD: 1.1 K/UL (ref 0–1)
MONOCYTES NFR BLD: 9 % (ref 5–13)
NEUTS SEG # BLD: 7.8 K/UL (ref 1.8–8)
NEUTS SEG NFR BLD: 70 % (ref 32–75)
NITRITE UR QL STRIP.AUTO: NEGATIVE
NRBC # BLD: 0 K/UL (ref 0–0.01)
NRBC BLD-RTO: 0 PER 100 WBC
PH UR STRIP: 7 [PH] (ref 5–8)
PLATELET # BLD AUTO: 245 K/UL (ref 150–400)
PMV BLD AUTO: 9.6 FL (ref 8.9–12.9)
POTASSIUM SERPL-SCNC: 4.2 MMOL/L (ref 3.5–5.1)
PROT SERPL-MCNC: 7.3 G/DL (ref 6.4–8.2)
PROT UR STRIP-MCNC: NEGATIVE MG/DL
RBC # BLD AUTO: 5.27 M/UL (ref 4.1–5.7)
RBC #/AREA URNS HPF: ABNORMAL /HPF (ref 0–5)
SODIUM SERPL-SCNC: 139 MMOL/L (ref 136–145)
SP GR UR REFRACTOMETRY: 1.01 (ref 1–1.03)
UA: UC IF INDICATED,UAUC: ABNORMAL
UROBILINOGEN UR QL STRIP.AUTO: 0.2 EU/DL (ref 0.2–1)
WBC # BLD AUTO: 11.2 K/UL (ref 4.1–11.1)
WBC URNS QL MICRO: ABNORMAL /HPF (ref 0–4)

## 2020-05-12 PROCEDURE — 80053 COMPREHEN METABOLIC PANEL: CPT

## 2020-05-12 PROCEDURE — 81001 URINALYSIS AUTO W/SCOPE: CPT

## 2020-05-12 PROCEDURE — 36415 COLL VENOUS BLD VENIPUNCTURE: CPT

## 2020-05-12 PROCEDURE — 99283 EMERGENCY DEPT VISIT LOW MDM: CPT

## 2020-05-12 PROCEDURE — 96374 THER/PROPH/DIAG INJ IV PUSH: CPT

## 2020-05-12 PROCEDURE — 96375 TX/PRO/DX INJ NEW DRUG ADDON: CPT

## 2020-05-12 PROCEDURE — 85025 COMPLETE CBC W/AUTO DIFF WBC: CPT

## 2020-05-12 PROCEDURE — 74011250636 HC RX REV CODE- 250/636: Performed by: EMERGENCY MEDICINE

## 2020-05-12 RX ORDER — PREDNISONE 10 MG/1
TABLET ORAL
Qty: 21 TAB | Refills: 0 | Status: SHIPPED | OUTPATIENT
Start: 2020-05-12 | End: 2020-06-23

## 2020-05-12 RX ORDER — MORPHINE SULFATE 10 MG/ML
6 INJECTION, SOLUTION INTRAMUSCULAR; INTRAVENOUS ONCE
Status: COMPLETED | OUTPATIENT
Start: 2020-05-12 | End: 2020-05-12

## 2020-05-12 RX ORDER — AMOXICILLIN AND CLAVULANATE POTASSIUM 875; 125 MG/1; MG/1
1 TABLET, FILM COATED ORAL 2 TIMES DAILY
Qty: 14 TAB | Refills: 0 | Status: SHIPPED | OUTPATIENT
Start: 2020-05-12 | End: 2020-05-19

## 2020-05-12 RX ADMIN — MORPHINE SULFATE 6 MG: 10 INJECTION INTRAVENOUS at 03:16

## 2020-05-12 RX ADMIN — METHYLPREDNISOLONE SODIUM SUCCINATE 125 MG: 125 INJECTION, POWDER, FOR SOLUTION INTRAMUSCULAR; INTRAVENOUS at 03:13

## 2020-05-12 NOTE — ED PROVIDER NOTES
EMERGENCY DEPARTMENT HISTORY AND PHYSICAL EXAM      Date: 5/12/2020  Patient Name: Carolynn Prieto  Patient Age and Sex: 31 y.o. male    History of Presenting Illness     Chief Complaint   Patient presents with    Abdominal Pain     Patient has Crohn's and has been having severe pain for about a week. Spoke to his doctor July Laureano who is trying to schedule a colonoscopy but the pain got too bad. History Provided By: Patient    Ability to gather history was limited by:     HPI: Holli Signs, 31 y.o. male with history of Crohn's disease, chronic abdominal pain, complains of generalized abdominal pain, reported as severe, aching and cramping in nature, constant and worsening for the past week. No nausea or vomiting, no fevers. Normal bowel movements. Location:    Quality:      Severity:    Duration:   Timing:      Context:    Modifying factors:   Associated symptoms:       The patient's medical, surgical, family, and social history on file were reviewed by me today. Past Medical History:   Diagnosis Date    Crohn's disease ([facility] 2019    Gastrointestinal disorder      History reviewed. No pertinent surgical history. PCP: Roya Michel MD    Past History     Past Medical History:  Past Medical History:   Diagnosis Date    Crohn's disease ([facility] 2019    Gastrointestinal disorder        Past Surgical History:  History reviewed. No pertinent surgical history. Family History:  History reviewed. No pertinent family history. Social History:  Social History     Tobacco Use    Smoking status: Never Smoker    Smokeless tobacco: Never Used   Substance Use Topics    Alcohol use: Yes     Comment: occ    Drug use: Never       Allergies:  No Known Allergies    Current Medications:  No current facility-administered medications on file prior to encounter.       Current Outpatient Medications on File Prior to Encounter   Medication Sig Dispense Refill    [DISCONTINUED] methylPREDNISolone (Medrol, Ubaldo,) 4 mg tablet Take as directed 1 Dose Pack 0    [DISCONTINUED] naproxen (NAPROSYN) 500 mg tablet Take 1 Tab by mouth two (2) times daily (with meals). 60 Tab 0    [DISCONTINUED] cetirizine (ZYRTEC) 10 mg tablet Take 1 Tab by mouth nightly. 30 Tab 3    cholecalciferol (VITAMIN D3) (5000 Units /125 mcg) capsule Take 1 Cap by mouth every Monday, Wednesday, Friday. 100 Cap 0       Review of Systems   Review of Systems   Constitutional: Negative for fever. Respiratory: Negative for shortness of breath. Gastrointestinal: Positive for abdominal pain. Negative for diarrhea, nausea and vomiting. All other systems reviewed and are negative. Physical Exam   Vital Signs  Patient Vitals for the past 8 hrs:   Temp Pulse Resp BP SpO2   05/12/20 0434  88 18 122/80 99 %   05/12/20 0248 97.7 °F (36.5 °C) 92 18 128/88 98 %          Physical Exam  Vitals signs and nursing note reviewed. Constitutional:       General: He is not in acute distress. Appearance: He is well-developed. HENT:      Head: Normocephalic and atraumatic. Eyes:      General:         Right eye: No discharge. Left eye: No discharge. Conjunctiva/sclera: Conjunctivae normal.   Neck:      Musculoskeletal: Normal range of motion and neck supple. Cardiovascular:      Rate and Rhythm: Normal rate and regular rhythm. Heart sounds: Normal heart sounds. No murmur. Pulmonary:      Effort: Pulmonary effort is normal. No respiratory distress. Breath sounds: Normal breath sounds. No wheezing. Abdominal:      General: There is no distension. Palpations: Abdomen is soft. Tenderness: There is no abdominal tenderness. Comments: Soft, no significant tenderness by exam   Musculoskeletal: Normal range of motion. General: No deformity. Skin:     General: Skin is warm and dry. Findings: No rash. Neurological:      Mental Status: He is alert and oriented to person, place, and time.    Psychiatric: Behavior: Behavior normal.         Thought Content: Thought content normal.         Diagnostic Study Results   Labs  Recent Results (from the past 24 hour(s))   URINALYSIS W/ REFLEX CULTURE    Collection Time: 05/12/20  2:48 AM   Result Value Ref Range    Color YELLOW/STRAW      Appearance CLOUDY (A) CLEAR      Specific gravity 1.013 1.003 - 1.030      pH (UA) 7.0 5.0 - 8.0      Protein Negative NEG mg/dL    Glucose Negative NEG mg/dL    Ketone Negative NEG mg/dL    Bilirubin Negative NEG      Blood SMALL (A) NEG      Urobilinogen 0.2 0.2 - 1.0 EU/dL    Nitrites Negative NEG      Leukocyte Esterase Negative NEG      WBC 0-4 0 - 4 /hpf    RBC 5-10 0 - 5 /hpf    Epithelial cells FEW FEW /lpf    Bacteria Negative NEG /hpf    UA:UC IF INDICATED CULTURE NOT INDICATED BY UA RESULT CNI      Hyaline cast 0-2 0 - 5 /lpf   CBC WITH AUTOMATED DIFF    Collection Time: 05/12/20  3:12 AM   Result Value Ref Range    WBC 11.2 (H) 4.1 - 11.1 K/uL    RBC 5.27 4. 10 - 5.70 M/uL    HGB 15.2 12.1 - 17.0 g/dL    HCT 43.9 36.6 - 50.3 %    MCV 83.3 80.0 - 99.0 FL    MCH 28.8 26.0 - 34.0 PG    MCHC 34.6 30.0 - 36.5 g/dL    RDW 12.8 11.5 - 14.5 %    PLATELET 476 756 - 504 K/uL    MPV 9.6 8.9 - 12.9 FL    NRBC 0.0 0  WBC    ABSOLUTE NRBC 0.00 0.00 - 0.01 K/uL    NEUTROPHILS 70 32 - 75 %    LYMPHOCYTES 18 12 - 49 %    MONOCYTES 9 5 - 13 %    EOSINOPHILS 2 0 - 7 %    BASOPHILS 0 0 - 1 %    IMMATURE GRANULOCYTES 1 (H) 0.0 - 0.5 %    ABS. NEUTROPHILS 7.8 1.8 - 8.0 K/UL    ABS. LYMPHOCYTES 2.0 0.8 - 3.5 K/UL    ABS. MONOCYTES 1.1 (H) 0.0 - 1.0 K/UL    ABS. EOSINOPHILS 0.2 0.0 - 0.4 K/UL    ABS. BASOPHILS 0.1 0.0 - 0.1 K/UL    ABS. IMM.  GRANS. 0.1 (H) 0.00 - 0.04 K/UL    DF AUTOMATED     METABOLIC PANEL, COMPREHENSIVE    Collection Time: 05/12/20  3:12 AM   Result Value Ref Range    Sodium 139 136 - 145 mmol/L    Potassium 4.2 3.5 - 5.1 mmol/L    Chloride 107 97 - 108 mmol/L    CO2 29 21 - 32 mmol/L    Anion gap 3 (L) 5 - 15 mmol/L    Glucose 92 65 - 100 mg/dL    BUN 18 6 - 20 MG/DL    Creatinine 1.14 0.70 - 1.30 MG/DL    BUN/Creatinine ratio 16 12 - 20      GFR est AA >60 >60 ml/min/1.73m2    GFR est non-AA >60 >60 ml/min/1.73m2    Calcium 8.9 8.5 - 10.1 MG/DL    Bilirubin, total 0.3 0.2 - 1.0 MG/DL    ALT (SGPT) 21 12 - 78 U/L    AST (SGOT) 12 (L) 15 - 37 U/L    Alk. phosphatase 71 45 - 117 U/L    Protein, total 7.3 6.4 - 8.2 g/dL    Albumin 3.1 (L) 3.5 - 5.0 g/dL    Globulin 4.2 (H) 2.0 - 4.0 g/dL    A-G Ratio 0.7 (L) 1.1 - 2.2         Radiologic Studies  No orders to display     CT Results  (Last 48 hours)    None        CXR Results  (Last 48 hours)    None          Procedures   Procedures    Medical Decision Making     I reviewed the patient's most recent Discharge Summaries, Emergency Dept   notes, and recent CT imaging from January in formulating my MDM. Provider Notes (Medical Decision Making):   76-year-old male with history of Crohn's disease, complaining of generalized abdominal pain worsening over the past week, consistent with prior flareups of Crohn's disease. On exam he is well-appearing, no apparent distress, normal vital signs, afebrile. His abdominal exam is benign and non-peritoneal, no significant focal tenderness. Clinical concern for acute complication from Crohn's disease is very low based on his benign H&P. No significant concern for peritonitis, perforation, abscess, or other surgical complication, and we can safely defer CT imaging today. Laboratories are reassuring. He felt better after morphine and Solu-Medrol. Will discharge home with prescription for prednisone, also prescription for Augmentin with a watch and wait approach and follow-up with his GI physician.       José Strickland MD  6:09 AM        Social History     Tobacco Use    Smoking status: Never Smoker    Smokeless tobacco: Never Used   Substance Use Topics    Alcohol use: Yes     Comment: occ    Drug use: Never     Patient Vitals for the past 4 hrs:   Temp Pulse Resp BP SpO2   05/12/20 0434  88 18 122/80 99 %   05/12/20 0248 97.7 °F (36.5 °C) 92 18 128/88 98 %            Consults:      Medications Administered during ED course:  Medications   morphine 10 mg/ml injection 6 mg (6 mg IntraVENous Given 5/12/20 0316)   methylPREDNISolone (PF) (Solu-MEDROL) injection 125 mg (125 mg IntraVENous Given 5/12/20 0313)                Diagnosis and Disposition     Disposition:  Discharged    Clinical Impression:   1. Crohn's disease of large intestine without complication ([facility] [address]        Attestation:  I personally performed the services described in this documentation on this date 5/12/2020 for patient Santina Claude. Corinne Fells, MD        I was the first provider for this patient on this visit. To the best of my ability I reviewed relevant prior medical records, electrocardiograms, laboratories, and radiologic studies. The patient's presenting problems were discussed, and the patient was in agreement with the care plan formulated and outlined with them. Corinne Fells, MD    Please note that this dictation was completed with Dragon voice recognition software. Quite often unanticipated grammatical, syntax, homophones, and other interpretive errors are inadvertently transcribed by the computer software. Please disregard these errors and excuse any errors that have escaped final proofreading.

## 2020-05-12 NOTE — DISCHARGE INSTRUCTIONS
Your labs and examination today are reassuring. Take the prednisone taper as prescribed. Consider starting Augmentin antibiotic if your symptoms continue, but at this time you can simply watch and wait.

## 2020-05-13 ENCOUNTER — PATIENT OUTREACH (OUTPATIENT)
Dept: CASE MANAGEMENT | Age: 25
End: 2020-05-13

## 2020-05-13 NOTE — PROGRESS NOTES
Patient contacted regarding recent discharge and COVID-19 risk   Care Transition Nurse/ Ambulatory Care Manager contacted the patient by telephone to perform post discharge assessment. Verified name and  with patient as identifiers. Patient has following risk factors of: no known risk factors. CTN/ACM reviewed discharge instructions, medical action plan and red flags related to discharge diagnosis. Reviewed and educated them on any new and changed medications related to discharge diagnosis. Advised obtaining a 90-day supply of all daily and as-needed medications. Education provided regarding infection prevention, and signs and symptoms of COVID-19 and when to seek medical attention with patient who verbalized understanding. Discussed exposure protocols and quarantine from [address] you at higher risk for severe illness  and given an opportunity for questions and concerns. [facility]. CTN/ACM provided contact information for future reference. From CDC: Are you at higher risk for severe illness?  Wash your hands often.  Avoid close contact (6 feet, which is about two arm lengths) with people who are sick.  Put distance between yourself and other people if COVID-19 is spreading in your community.  Clean and disinfect frequently touched surfaces.  Avoid all cruise travel and non-essential air travel.  Call your healthcare professional if you have concerns about COVID-19 and your underlying condition or if you are sick. For more information on steps you can take to protect yourself, see CDC's How to Protect Yourself      Patient/family/caregiver given information for GetWell Loop and agrees to enroll no  Patient's preferred e-mail:  declined  Patient's preferred phone number: declined   Based on Loop alert triggers, patient will be contacted by nurse care manager for worsening symptoms.     Plan for follow-up call in 7-14 days based on severity of symptoms and risk factors. Patient reports he was able to fill and start Prednisone and Augmentin and is feeling much better already. He has a colonoscopy scheduled on Friday with GI to evaluate further. COVID teaching provided . Patient has no questions or concerns. Declined to take the number for the exposure line and declined Get well loop.

## 2020-05-27 ENCOUNTER — PATIENT OUTREACH (OUTPATIENT)
Dept: CASE MANAGEMENT | Age: 25
End: 2020-05-27

## 2020-05-27 NOTE — PROGRESS NOTES
Patient resolved from Transition of Care episode on 5/27/20  Discussed COVID-19 related testing which was not done at this time. Test results were not done. Patient informed of results, if available? no     Patient/family has been provided the following resources and education related to COVID-19:                         Signs, symptoms and red flags related to COVID-19            [facility] exposure and quarantine guidelines            [employer] exposure contact - [phone]            Contact for their local Department of Health                 Patient currently reports that the following symptoms have improved:  pain or aching joints- reports abdominal pain improved. Has had colonoscopy and seen GI no further concerns. No further outreach scheduled with this CTN/ACM/LPN/HC/ MA. Episode of Care resolved. Patient has this CTN/ACM/LPN/HC/MA contact information if future needs arise.

## 2020-05-28 RX ORDER — SODIUM CHLORIDE 9 MG/ML
25 INJECTION, SOLUTION INTRAVENOUS ONCE
Status: COMPLETED | OUTPATIENT
Start: 2020-06-05 | End: 2020-06-05

## 2020-05-28 RX ORDER — ACETAMINOPHEN 325 MG/1
650 TABLET ORAL ONCE
Status: COMPLETED | OUTPATIENT
Start: 2020-06-05 | End: 2020-06-05

## 2020-05-28 RX ORDER — DIPHENHYDRAMINE HYDROCHLORIDE 50 MG/ML
50 INJECTION, SOLUTION INTRAMUSCULAR; INTRAVENOUS
Status: ACTIVE | OUTPATIENT
Start: 2020-06-05 | End: 2020-06-05

## 2020-05-28 RX ORDER — ACETAMINOPHEN 325 MG/1
650 TABLET ORAL
Status: ACTIVE | OUTPATIENT
Start: 2020-06-05 | End: 2020-06-05

## 2020-05-28 RX ORDER — DIPHENHYDRAMINE HCL 25 MG
50 CAPSULE ORAL ONCE
Status: COMPLETED | OUTPATIENT
Start: 2020-06-05 | End: 2020-06-05

## 2020-05-29 ENCOUNTER — APPOINTMENT (OUTPATIENT)
Dept: INFUSION THERAPY | Age: 25
End: 2020-05-29

## 2020-06-05 ENCOUNTER — HOSPITAL ENCOUNTER (OUTPATIENT)
Dept: INFUSION THERAPY | Age: 25
Discharge: HOME | End: 2020-06-05
Payer: COMMERCIAL

## 2020-06-05 VITALS
DIASTOLIC BLOOD PRESSURE: 62 MMHG | SYSTOLIC BLOOD PRESSURE: 109 MMHG | RESPIRATION RATE: 16 BRPM | BODY MASS INDEX: 23.28 KG/M2 | HEART RATE: 81 BPM | HEIGHT: 70 IN | WEIGHT: 162.6 LBS | TEMPERATURE: 98.8 F

## 2020-06-05 PROCEDURE — 74011250636 HC RX REV CODE- 250/636: Performed by: INTERNAL MEDICINE

## 2020-06-05 PROCEDURE — 74011250637 HC RX REV CODE- 250/637: Performed by: INTERNAL MEDICINE

## 2020-06-05 PROCEDURE — 96415 CHEMO IV INFUSION ADDL HR: CPT

## 2020-06-05 PROCEDURE — 96413 CHEMO IV INFUSION 1 HR: CPT

## 2020-06-05 RX ORDER — SODIUM CHLORIDE 0.9 % (FLUSH) 0.9 %
5-10 SYRINGE (ML) INJECTION AS NEEDED
Status: DISCONTINUED | OUTPATIENT
Start: 2020-06-05 | End: 2020-06-06 | Stop reason: HOSPADM

## 2020-06-05 RX ADMIN — SODIUM CHLORIDE 400 MG: 9 INJECTION, SOLUTION INTRAVENOUS at 13:15

## 2020-06-05 RX ADMIN — DIPHENHYDRAMINE HYDROCHLORIDE 50 MG: 25 CAPSULE ORAL at 12:19

## 2020-06-05 RX ADMIN — SODIUM CHLORIDE 25 ML/HR: 900 INJECTION, SOLUTION INTRAVENOUS at 13:12

## 2020-06-05 RX ADMIN — ACETAMINOPHEN 650 MG: 325 TABLET ORAL at 12:19

## 2020-06-05 RX ADMIN — Medication 10 ML: at 12:15

## 2020-06-05 NOTE — PROGRESS NOTES
[facility] Visit Note    0800 Pt arrived for Renflexis. Auth still pending. Left on pt cell phone last night. Pt made aware and would like to wait. Hope to have answer by 9:30. Email and call placed to auth team.  0930 No response. Offered patient to reschedule. Pt wishes to continue waiting. [address] team on phone with insurance company and will call us back when they know more. 1150  Auth obtained. Proceeding with treatment.  Care to Figueroa Perales RN

## 2020-06-05 NOTE — PROGRESS NOTES
1200- Pt arrived to [facility] ambulatory in no acute distress for Week 0 Renflexis.  Assessment unremarkable. IV established in left AC without issue and positive blood return noted. Around 14:15 before titrating to 150 ml/hr noticed that IV had infiltrated. New IV started in right [facility] before increasing Remicade rate to 150 ml/hr. Right AC IV remained patent for rest of infusion. The following medications administered:  NS @ KVO  Tylenol 650 mg PO  Benadryl 50 mg PO  Renflexis 400 mg IV titrated starting at 10 ml/hr    Patient Vitals for the past 12 hrs:   Temp Pulse Resp BP   06/05/20 1525 98.8 °F (37.1 °C) 81 16 109/62   06/05/20 1445 98.7 °F (37.1 °C) 69 16 106/64   06/05/20 1415 98.2 °F (36.8 °C) 79 16 121/80   06/05/20 1400 98.2 °F (36.8 °C) 73 16 110/69   06/05/20 1345 98.2 °F (36.8 °C) 78 16 104/70   06/05/20 1330 99 °F (37.2 °C) 76 16 115/70   06/05/20 1211 97 °F (36.1 °C) 65 16 125/86     1530- Pt tolerated treatment well, no adverse reactions noted.  IV flushed per policy and removed, 2x2 and coban placed.  Pt discharged ambulatory in no acute distress, accompanied by self. Next appointment 6/23/20.

## 2020-06-15 RX ORDER — SODIUM CHLORIDE 9 MG/ML
25 INJECTION, SOLUTION INTRAVENOUS ONCE
Status: COMPLETED | OUTPATIENT
Start: 2020-06-23 | End: 2020-06-23

## 2020-06-15 RX ORDER — ACETAMINOPHEN 325 MG/1
650 TABLET ORAL
Status: ACTIVE | OUTPATIENT
Start: 2020-06-23 | End: 2020-06-23

## 2020-06-15 RX ORDER — DIPHENHYDRAMINE HCL 25 MG
50 CAPSULE ORAL ONCE
Status: COMPLETED | OUTPATIENT
Start: 2020-06-23 | End: 2020-06-23

## 2020-06-15 RX ORDER — ACETAMINOPHEN 325 MG/1
650 TABLET ORAL ONCE
Status: COMPLETED | OUTPATIENT
Start: 2020-06-23 | End: 2020-06-23

## 2020-06-15 RX ORDER — DIPHENHYDRAMINE HYDROCHLORIDE 50 MG/ML
50 INJECTION, SOLUTION INTRAMUSCULAR; INTRAVENOUS
Status: ACTIVE | OUTPATIENT
Start: 2020-06-23 | End: 2020-06-23

## 2020-06-23 ENCOUNTER — HOSPITAL ENCOUNTER (OUTPATIENT)
Dept: INFUSION THERAPY | Age: 25
Discharge: HOME | End: 2020-06-23
Payer: COMMERCIAL

## 2020-06-23 VITALS
DIASTOLIC BLOOD PRESSURE: 77 MMHG | HEART RATE: 77 BPM | BODY MASS INDEX: 23.36 KG/M2 | TEMPERATURE: 97.4 F | SYSTOLIC BLOOD PRESSURE: 126 MMHG | WEIGHT: 162.8 LBS | RESPIRATION RATE: 18 BRPM

## 2020-06-23 PROCEDURE — 74011000258 HC RX REV CODE- 258: Performed by: INTERNAL MEDICINE

## 2020-06-23 PROCEDURE — 74011250636 HC RX REV CODE- 250/636

## 2020-06-23 PROCEDURE — 74011250637 HC RX REV CODE- 250/637: Performed by: INTERNAL MEDICINE

## 2020-06-23 PROCEDURE — 96413 CHEMO IV INFUSION 1 HR: CPT

## 2020-06-23 RX ORDER — SODIUM CHLORIDE 0.9 % (FLUSH) 0.9 %
10-40 SYRINGE (ML) INJECTION AS NEEDED
Status: DISCONTINUED | OUTPATIENT
Start: 2020-06-23 | End: 2020-06-24 | Stop reason: HOSPADM

## 2020-06-23 RX ADMIN — DIPHENHYDRAMINE HYDROCHLORIDE 50 MG: 25 CAPSULE ORAL at 13:30

## 2020-06-23 RX ADMIN — ACETAMINOPHEN 650 MG: 325 TABLET ORAL at 13:31

## 2020-06-23 RX ADMIN — SODIUM CHLORIDE 400 MG: 9 INJECTION, SOLUTION INTRAVENOUS at 13:50

## 2020-06-23 RX ADMIN — Medication 10 ML: at 14:50

## 2020-06-23 RX ADMIN — SODIUM CHLORIDE 25 ML/HR: 900 INJECTION, SOLUTION INTRAVENOUS at 13:28

## 2020-06-23 RX ADMIN — Medication 10 ML: at 13:25

## 2020-06-23 NOTE — PROGRESS NOTES
Outpatient Infusion Center - Chemotherapy Progress Note    1320  Pt admit to [facility] for week 2 Renflexis ambulatory in stable condition. Assessment completed. No acute concerns voiced. 24 gauge PIV placed in left AC with positive blood return. Medications:  NS  Tylenol PO  Benadryl PO  Renflexis IV     40 minutes into infusion, nurse noted that patient had received full volume of Renflexis. Medication infused by gravity instead of infusion pump due to secondary IV tubing/filter being connected to the hub below IV pump. Pt reports feeling normal, no symptoms. Vitals obtained and unchanged from prior to infusion. Notified Gama Lizama, nurse for Dr. Loly Vee, of infusion length and no additional orders received. Patient Vitals for the past 12 hrs:   Temp Pulse Resp BP   06/23/20 1534 97.4 °F (36.3 °C) 77 18 126/77   06/23/20 1430 98.3 °F (36.8 °C) 67 18 118/70   06/23/20 1320 97.1 °F (36.2 °C) 90 16 126/74     1535  Pt tolerated treatment well, monitored 60 minutes following completion of infusion with no adverse reaction noted. Positive blood return pre and post infusion. PIV flushed and removed per [facility] policy. D/c home ambulatory in no distress. Pt to follow up with MD office for next appt due to insurance authorization.

## 2020-09-15 ENCOUNTER — VIRTUAL VISIT (OUTPATIENT)
Dept: FAMILY MEDICINE CLINIC | Age: 25
End: 2020-09-15

## 2020-09-15 DIAGNOSIS — E55.9 VITAMIN D DEFICIENCY: ICD-10-CM

## 2020-09-15 RX ORDER — CHOLECALCIFEROL (VITAMIN D3) 125 MCG
5000 CAPSULE ORAL
Qty: 100 CAP | Refills: 0 | Status: SHIPPED | OUTPATIENT
Start: 2020-09-16

## 2021-01-20 PROCEDURE — 87086 URINE CULTURE/COLONY COUNT: CPT

## 2021-01-20 PROCEDURE — 87186 SC STD MICRODIL/AGAR DIL: CPT

## 2021-01-20 PROCEDURE — 81001 URINALYSIS AUTO W/SCOPE: CPT

## 2021-01-20 PROCEDURE — 96365 THER/PROPH/DIAG IV INF INIT: CPT

## 2021-01-20 PROCEDURE — 99284 EMERGENCY DEPT VISIT MOD MDM: CPT

## 2021-01-20 PROCEDURE — 87077 CULTURE AEROBIC IDENTIFY: CPT

## 2021-01-21 ENCOUNTER — HOSPITAL ENCOUNTER (EMERGENCY)
Age: 26
Discharge: HOME | End: 2021-01-21
Attending: EMERGENCY MEDICINE
Payer: COMMERCIAL

## 2021-01-21 ENCOUNTER — APPOINTMENT (OUTPATIENT)
Dept: CT IMAGING | Age: 26
End: 2021-01-21
Attending: PHYSICIAN ASSISTANT
Payer: COMMERCIAL

## 2021-01-21 VITALS
WEIGHT: 177.25 LBS | TEMPERATURE: 99.4 F | OXYGEN SATURATION: 97 % | DIASTOLIC BLOOD PRESSURE: 73 MMHG | HEART RATE: 118 BPM | HEIGHT: 65 IN | RESPIRATION RATE: 18 BRPM | SYSTOLIC BLOOD PRESSURE: 121 MMHG | BODY MASS INDEX: 29.53 KG/M2

## 2021-01-21 DIAGNOSIS — N12 PYELONEPHRITIS: Primary | ICD-10-CM

## 2021-01-21 LAB
ALBUMIN SERPL-MCNC: 3.6 G/DL (ref 3.5–5)
ALBUMIN/GLOB SERPL: 0.9 {RATIO} (ref 1.1–2.2)
ALP SERPL-CCNC: 72 U/L (ref 45–117)
ALT SERPL-CCNC: 39 U/L (ref 12–78)
ANION GAP SERPL CALC-SCNC: 5 MMOL/L (ref 5–15)
APPEARANCE UR: ABNORMAL
AST SERPL-CCNC: 16 U/L (ref 15–37)
BACTERIA URNS QL MICRO: ABNORMAL /HPF
BASOPHILS # BLD: 0.1 K/UL (ref 0–0.1)
BASOPHILS NFR BLD: 1 % (ref 0–1)
BILIRUB SERPL-MCNC: 0.5 MG/DL (ref 0.2–1)
BILIRUB UR QL: NEGATIVE
BUN SERPL-MCNC: 17 MG/DL (ref 6–20)
BUN/CREAT SERPL: 16 (ref 12–20)
CALCIUM SERPL-MCNC: 8.7 MG/DL (ref 8.5–10.1)
CHLORIDE SERPL-SCNC: 103 MMOL/L (ref 97–108)
CO2 SERPL-SCNC: 26 MMOL/L (ref 21–32)
COLOR UR: ABNORMAL
CREAT SERPL-MCNC: 1.07 MG/DL (ref 0.7–1.3)
DIFFERENTIAL METHOD BLD: ABNORMAL
EOSINOPHIL # BLD: 0.1 K/UL (ref 0–0.4)
EOSINOPHIL NFR BLD: 0 % (ref 0–7)
EPITH CASTS URNS QL MICRO: ABNORMAL /LPF
ERYTHROCYTE [DISTWIDTH] IN BLOOD BY AUTOMATED COUNT: 12.6 % (ref 11.5–14.5)
GLOBULIN SER CALC-MCNC: 3.9 G/DL (ref 2–4)
GLUCOSE SERPL-MCNC: 93 MG/DL (ref 65–100)
GLUCOSE UR STRIP.AUTO-MCNC: NEGATIVE MG/DL
HCT VFR BLD AUTO: 43.9 % (ref 36.6–50.3)
HGB BLD-MCNC: 15.2 G/DL (ref 12.1–17)
HGB UR QL STRIP: ABNORMAL
IMM GRANULOCYTES # BLD AUTO: 0.1 K/UL (ref 0–0.04)
IMM GRANULOCYTES NFR BLD AUTO: 0 % (ref 0–0.5)
KETONES UR QL STRIP.AUTO: ABNORMAL MG/DL
LEUKOCYTE ESTERASE UR QL STRIP.AUTO: ABNORMAL
LYMPHOCYTES # BLD: 2.3 K/UL (ref 0.8–3.5)
LYMPHOCYTES NFR BLD: 12 % (ref 12–49)
MCH RBC QN AUTO: 28.8 PG (ref 26–34)
MCHC RBC AUTO-ENTMCNC: 34.6 G/DL (ref 30–36.5)
MCV RBC AUTO: 83.1 FL (ref 80–99)
MONOCYTES # BLD: 1.7 K/UL (ref 0–1)
MONOCYTES NFR BLD: 8 % (ref 5–13)
NEUTS SEG # BLD: 15.8 K/UL (ref 1.8–8)
NEUTS SEG NFR BLD: 79 % (ref 32–75)
NITRITE UR QL STRIP.AUTO: POSITIVE
NRBC # BLD: 0 K/UL (ref 0–0.01)
NRBC BLD-RTO: 0 PER 100 WBC
PH UR STRIP: 6.5 [PH] (ref 5–8)
PLATELET # BLD AUTO: 253 K/UL (ref 150–400)
PMV BLD AUTO: 9.7 FL (ref 8.9–12.9)
POTASSIUM SERPL-SCNC: 3.8 MMOL/L (ref 3.5–5.1)
PROT SERPL-MCNC: 7.5 G/DL (ref 6.4–8.2)
PROT UR STRIP-MCNC: 100 MG/DL
RBC # BLD AUTO: 5.28 M/UL (ref 4.1–5.7)
RBC #/AREA URNS HPF: >100 /HPF (ref 0–5)
SODIUM SERPL-SCNC: 134 MMOL/L (ref 136–145)
SP GR UR REFRACTOMETRY: 1.02 (ref 1–1.03)
UA: UC IF INDICATED,UAUC: ABNORMAL
UROBILINOGEN UR QL STRIP.AUTO: 1 EU/DL (ref 0.2–1)
WBC # BLD AUTO: 20 K/UL (ref 4.1–11.1)
WBC URNS QL MICRO: ABNORMAL /HPF (ref 0–4)

## 2021-01-21 PROCEDURE — 74011250637 HC RX REV CODE- 250/637: Performed by: PHYSICIAN ASSISTANT

## 2021-01-21 PROCEDURE — 74176 CT ABD & PELVIS W/O CONTRAST: CPT

## 2021-01-21 PROCEDURE — 74011250636 HC RX REV CODE- 250/636: Performed by: PHYSICIAN ASSISTANT

## 2021-01-21 PROCEDURE — 74011000258 HC RX REV CODE- 258: Performed by: PHYSICIAN ASSISTANT

## 2021-01-21 PROCEDURE — 36415 COLL VENOUS BLD VENIPUNCTURE: CPT

## 2021-01-21 PROCEDURE — 80053 COMPREHEN METABOLIC PANEL: CPT

## 2021-01-21 PROCEDURE — 96365 THER/PROPH/DIAG IV INF INIT: CPT

## 2021-01-21 PROCEDURE — 85025 COMPLETE CBC W/AUTO DIFF WBC: CPT

## 2021-01-21 RX ORDER — ONDANSETRON 4 MG/1
4 TABLET, ORALLY DISINTEGRATING ORAL
Qty: 10 TAB | Refills: 0 | Status: SHIPPED | OUTPATIENT
Start: 2021-01-21

## 2021-01-21 RX ORDER — LEVOFLOXACIN 500 MG/1
500 TABLET, FILM COATED ORAL DAILY
Qty: 7 TAB | Refills: 0 | Status: SHIPPED | OUTPATIENT
Start: 2021-01-21

## 2021-01-21 RX ORDER — PHENAZOPYRIDINE HYDROCHLORIDE 100 MG/1
200 TABLET, FILM COATED ORAL
Status: COMPLETED | OUTPATIENT
Start: 2021-01-21 | End: 2021-01-21

## 2021-01-21 RX ADMIN — CEFTRIAXONE 1 G: 1 INJECTION, POWDER, FOR SOLUTION INTRAMUSCULAR; INTRAVENOUS at 01:37

## 2021-01-21 RX ADMIN — PHENAZOPYRIDINE 200 MG: 100 TABLET ORAL at 01:37

## 2021-01-21 RX ADMIN — SODIUM CHLORIDE 1000 ML: 9 INJECTION, SOLUTION INTRAVENOUS at 01:42

## 2021-01-21 NOTE — ED PROVIDER NOTES
EMERGENCY DEPARTMENT HISTORY AND PHYSICAL EXAM      Date: 1/21/2021  Patient Name: Shelley Prieto    History of Presenting Illness     Chief Complaint   Patient presents with    Blood in Urine     X2 HOURS        History Provided By: Patient    HPI: Jill Orr, 31 y.o. male with significant PMHx for Chron's, presents by POV to the ED with cc of urinary complaints. About 2 hours prior to arrival he developed acute onset of increased urinary frequency and dysuria. He also notes blood in his urine at the end of his stream of urine. He denies abdominal pain, back pain, fever and chills. He did develop nausea with vomiting while in the ED and have the IV inserted. Since that time the nausea and vomiting have resolved without invention. He reports taking an over the counter \"urine pill\" that was given to him by his wife PTA that was no helpful in relieving his symptoms. The patient reports that something similar happened many years ago and he was diagnosed with a urinary tract infection. There are no other complaints, changes, or physical findings at this time. Social Hx: Tobacco (denies), EtOH (denies), Illicit drug use (denies)     PCP: Giselle Wright MD    No current facility-administered medications on file prior to encounter. Current Outpatient Medications on File Prior to Encounter   Medication Sig Dispense Refill    cholecalciferol (VITAMIN D3) (5000 Units /125 mcg) capsule Take 1 Cap by mouth every Monday, Wednesday, Friday. 100 Cap 0       Past History     Past Medical History:  Past Medical History:   Diagnosis Date    Crohn's disease ([facility] [address] 2019    Gastrointestinal disorder     Vitamin D deficiency        Past Surgical History:  No past surgical history on file. Family History:  No family history on file.     Social History:  Social History     Tobacco Use    Smoking status: Never Smoker    Smokeless tobacco: Never Used   Substance Use Topics    Alcohol use: Yes     Comment: occ    Drug use: Never       Allergies:  No Known Allergies      Review of Systems   Review of Systems   Constitutional: Negative for chills, diaphoresis and fever. HENT: Negative for congestion, ear pain, rhinorrhea and sore throat. Respiratory: Negative for cough and shortness of breath. Cardiovascular: Negative for chest pain. Gastrointestinal: Positive for nausea and vomiting. Negative for abdominal pain, constipation and diarrhea. Genitourinary: Positive for frequency and hematuria. Negative for difficulty urinating, discharge, dysuria, flank pain and penile pain. Musculoskeletal: Negative for arthralgias and myalgias. Neurological: Negative for headaches. All other systems reviewed and are negative. Physical Exam   Physical Exam  Vitals signs and nursing note reviewed. Constitutional:       General: He is not in acute distress. Appearance: He is well-developed. He is not diaphoretic. Comments: 31 y.o.  male    HENT:      Head: Normocephalic and atraumatic. Eyes:      General:         Right eye: No discharge. Left eye: No discharge. Conjunctiva/sclera: Conjunctivae normal.   Neck:      Musculoskeletal: Normal range of motion and neck supple. Cardiovascular:      Rate and Rhythm: Normal rate and regular rhythm. Heart sounds: Normal heart sounds. No murmur. Pulmonary:      Effort: Pulmonary effort is normal. No respiratory distress. Breath sounds: Normal breath sounds. Skin:     General: Skin is warm and dry. Neurological:      Mental Status: He is alert and oriented to person, place, and time.    Psychiatric:         Behavior: Behavior normal.         Diagnostic Study Results     Labs -     Recent Results (from the past 12 hour(s))   URINALYSIS W/ REFLEX CULTURE    Collection Time: 01/20/21 11:58 PM    Specimen: Urine   Result Value Ref Range    Color PINK      Appearance TURBID (A) CLEAR      Specific gravity 1.019 1.003 - 1.030      pH (UA) 6.5 5.0 - 8.0      Protein 100 (A) NEG mg/dL    Glucose Negative NEG mg/dL    Ketone TRACE (A) NEG mg/dL    Bilirubin Negative NEG      Blood LARGE (A) NEG      Urobilinogen 1.0 0.2 - 1.0 EU/dL    Nitrites Positive (A) NEG      Leukocyte Esterase LARGE (A) NEG      WBC  0 - 4 /hpf    RBC >100 (H) 0 - 5 /hpf    Epithelial cells FEW FEW /lpf    Bacteria 1+ (A) NEG /hpf    UA:UC IF INDICATED URINE CULTURE ORDERED (A) CNI     CBC WITH AUTOMATED DIFF    Collection Time: 01/21/21 12:16 AM   Result Value Ref Range    WBC 20.0 (H) 4.1 - 11.1 K/uL    RBC 5.28 4.10 - 5.70 M/uL    HGB 15.2 12.1 - 17.0 g/dL    HCT 43.9 36.6 - 50.3 %    MCV 83.1 80.0 - 99.0 FL    MCH 28.8 26.0 - 34.0 PG    MCHC 34.6 30.0 - 36.5 g/dL    RDW 12.6 11.5 - 14.5 %    PLATELET 780 431 - 563 K/uL    MPV 9.7 8.9 - 12.9 FL    NRBC 0.0 0  WBC    ABSOLUTE NRBC 0.00 0.00 - 0.01 K/uL    NEUTROPHILS 79 (H) 32 - 75 %    LYMPHOCYTES 12 12 - 49 %    MONOCYTES 8 5 - 13 %    EOSINOPHILS 0 0 - 7 %    BASOPHILS 1 0 - 1 %    IMMATURE GRANULOCYTES 0 0.0 - 0.5 %    ABS. NEUTROPHILS 15.8 (H) 1.8 - 8.0 K/UL    ABS. LYMPHOCYTES 2.3 0.8 - 3.5 K/UL    ABS. MONOCYTES 1.7 (H) 0.0 - 1.0 K/UL    ABS. EOSINOPHILS 0.1 0.0 - 0.4 K/UL    ABS. BASOPHILS 0.1 0.0 - 0.1 K/UL    ABS. IMM. GRANS. 0.1 (H) 0.00 - 0.04 K/UL    DF AUTOMATED     METABOLIC PANEL, COMPREHENSIVE    Collection Time: 01/21/21 12:16 AM   Result Value Ref Range    Sodium 134 (L) 136 - 145 mmol/L    Potassium 3.8 3.5 - 5.1 mmol/L    Chloride 103 97 - 108 mmol/L    CO2 26 21 - 32 mmol/L    Anion gap 5 5 - 15 mmol/L    Glucose 93 65 - 100 mg/dL    BUN 17 6 - 20 MG/DL    Creatinine 1.07 0.70 - 1.30 MG/DL    BUN/Creatinine ratio 16 12 - 20      GFR est AA >60 >60 ml/min/1.73m2    GFR est non-AA >60 >60 ml/min/1.73m2    Calcium 8.7 8.5 - 10.1 MG/DL    Bilirubin, total 0.5 0.2 - 1.0 MG/DL    ALT (SGPT) 39 12 - 78 U/L    AST (SGOT) 16 15 - 37 U/L    Alk.  phosphatase 72 45 - 117 U/L    Protein, total 7.5 6.4 - 8.2 g/dL Albumin 3.6 3.5 - 5.0 g/dL    Globulin 3.9 2.0 - 4.0 g/dL    A-G Ratio 0.9 (L) 1.1 - 2.2         Radiologic Studies -   CT Results  (Last 48 hours)               01/21/21 0056  CT ABD PELV WO CONT Final result    Impression:  No acute abnormality in the abdomen or pelvis. Narrative:  EXAM:  CT abdomen pelvis without contrast       INDICATION: Hematuria. COMPARISON: CT 1/25/2020. TECHNIQUE: Helical CT of the abdomen  and pelvis  without contrast. Coronal and   sagittal reformats are performed. CT dose reduction was achieved through use of   a standardized protocol tailored for this examination and automatic exposure   control for dose modulation. FINDINGS:    Solid organ evaluation is limited without contrast.        The visualized lung bases demonstrate no mass or consolidation. The heart size   is normal. There is no pericardial or pleural effusion. There is no renal, ureteral, or bladder calculus. The kidneys are symmetric   without hydronephrosis. There is no perinephric fluid or fat stranding. The liver, spleen, pancreas, and adrenal glands are normal.  The gall bladder is   present  without intra- or extra-hepatic biliary dilatation. There are no dilated bowel loops. The appendix is normal.         There are no enlarged lymph nodes. There is no free fluid or free air. The   aorta is normal in caliber. The urinary bladder is unremarkable. There is no pelvic mass. The prostate is   not enlarged. The bony structures are age-appropriate. Medical Decision Making   I am the first provider for this patient. I reviewed the vital signs, available nursing notes, past medical history, past surgical history, family history and social history. Vital Signs-Reviewed the patient's vital signs.   Patient Vitals for the past 12 hrs:   Temp Pulse Resp BP SpO2   01/21/21 0209     97 %   01/21/21 0205     97 %   01/21/21 0204     96 %   01/21/21 0200    121/73 97 %   01/21/21 0158     96 %   01/21/21 0157     96 %   01/21/21 0151     96 %   01/21/21 0145    129/78    01/20/21 2256 99.4 °F (37.4 °C) (!) 118 18 (!) 158/93 96 %       Records Reviewed: Nursing Notes    Provider Notes (Medical Decision Making): The patient presents to the ED tachycardic with urinary complaints. DDx include UTI, pyelonephritis, kidney stone, STI, cystitis. WBC count elevated. Renal function normal. UA show both white and red cells are present. CT is negative for acute pathology. Will treated patient for pyelonephritis with antibiotics. ED Course:   Initial assessment performed. The patients presenting problems have been discussed, and they are in agreement with the care plan formulated and outlined with them. I have encouraged them to ask questions as they arise throughout their visit. Critical Care Time: None    Disposition:  DISCHARGE NOTE:  3:09 AM  The pt is ready for discharge. The pt's signs, symptoms, diagnosis, and discharge instructions have been discussed and pt has conveyed their understanding. The pt is to follow up as recommended or return to ER should their symptoms worsen. Plan has been discussed and pt is in agreement. PLAN:  1. Discharge Medication List as of 1/21/2021  2:19 AM      START taking these medications    Details   ondansetron (Zofran ODT) 4 mg disintegrating tablet Take 1 Tab by mouth every eight (8) hours as needed for Nausea., Normal, Disp-10 Tab, R-0      levoFLOXacin (Levaquin) 500 mg tablet Take 1 Tab by mouth daily. , Normal, Disp-7 Tab, R-0         CONTINUE these medications which have NOT CHANGED    Details   cholecalciferol (VITAMIN D3) (5000 Units /125 mcg) capsule Take 1 Cap by mouth every Monday, Wednesday, Friday., Normal, Disp-100 Cap,R-0           2.    Follow-up Information     Follow up With Specialties Details Why Contact Info    Chico Sifuentes MD Internal Medicine In 1 week  [phone] [address] [place] [address]  [phone]      [facility] EMERGENCY DEPT Emergency Medicine  If symptoms worsen [address] [place] [address]  [phone]        Return to ED if worse     Diagnosis     Clinical Impression:   1. Pyelonephritis          Please note that this dictation was completed with [employer], the computer voice recognition software. Quite often unanticipated grammatical, syntax, homophones, and other interpretive errors are inadvertently transcribed by the computer software. Please disregards these errors. Please excuse any errors that have escaped final proofreading.

## 2021-01-23 LAB
BACTERIA SPEC CULT: ABNORMAL
CC UR VC: ABNORMAL
SERVICE CMNT-IMP: ABNORMAL

## 2021-06-20 ENCOUNTER — HOSPITAL ENCOUNTER (EMERGENCY)
Age: 26
Discharge: HOME | End: 2021-06-20
Attending: EMERGENCY MEDICINE
Payer: COMMERCIAL

## 2021-06-20 VITALS
WEIGHT: 159.83 LBS | TEMPERATURE: 98.4 F | OXYGEN SATURATION: 99 % | DIASTOLIC BLOOD PRESSURE: 79 MMHG | BODY MASS INDEX: 22.88 KG/M2 | SYSTOLIC BLOOD PRESSURE: 132 MMHG | RESPIRATION RATE: 14 BRPM | HEIGHT: 70 IN | HEART RATE: 80 BPM

## 2021-06-20 DIAGNOSIS — K50.90 EXACERBATION OF CROHN'S DISEASE WITHOUT COMPLICATION (HCC): Primary | ICD-10-CM

## 2021-06-20 LAB
ALBUMIN SERPL-MCNC: 3.6 G/DL (ref 3.5–5)
ALBUMIN/GLOB SERPL: 0.9 {RATIO} (ref 1.1–2.2)
ALP SERPL-CCNC: 59 U/L (ref 45–117)
ALT SERPL-CCNC: 34 U/L (ref 12–78)
ANION GAP SERPL CALC-SCNC: 4 MMOL/L (ref 5–15)
AST SERPL-CCNC: 20 U/L (ref 15–37)
BASOPHILS # BLD: 0 K/UL (ref 0–0.1)
BASOPHILS NFR BLD: 1 % (ref 0–1)
BILIRUB SERPL-MCNC: 0.3 MG/DL (ref 0.2–1)
BUN SERPL-MCNC: 18 MG/DL (ref 6–20)
BUN/CREAT SERPL: 20 (ref 12–20)
CALCIUM SERPL-MCNC: 8.6 MG/DL (ref 8.5–10.1)
CHLORIDE SERPL-SCNC: 106 MMOL/L (ref 97–108)
CO2 SERPL-SCNC: 28 MMOL/L (ref 21–32)
CREAT SERPL-MCNC: 0.89 MG/DL (ref 0.7–1.3)
DIFFERENTIAL METHOD BLD: ABNORMAL
EOSINOPHIL # BLD: 0.2 K/UL (ref 0–0.4)
EOSINOPHIL NFR BLD: 3 % (ref 0–7)
ERYTHROCYTE [DISTWIDTH] IN BLOOD BY AUTOMATED COUNT: 12.5 % (ref 11.5–14.5)
GLOBULIN SER CALC-MCNC: 4 G/DL (ref 2–4)
GLUCOSE SERPL-MCNC: 97 MG/DL (ref 65–100)
HCT VFR BLD AUTO: 41.6 % (ref 36.6–50.3)
HGB BLD-MCNC: 14.6 G/DL (ref 12.1–17)
IMM GRANULOCYTES # BLD AUTO: 0 K/UL (ref 0–0.04)
IMM GRANULOCYTES NFR BLD AUTO: 0 % (ref 0–0.5)
LIPASE SERPL-CCNC: 167 U/L (ref 73–393)
LYMPHOCYTES # BLD: 3 K/UL (ref 0.8–3.5)
LYMPHOCYTES NFR BLD: 38 % (ref 12–49)
MCH RBC QN AUTO: 29.6 PG (ref 26–34)
MCHC RBC AUTO-ENTMCNC: 35.1 G/DL (ref 30–36.5)
MCV RBC AUTO: 84.2 FL (ref 80–99)
MONOCYTES # BLD: 1.1 K/UL (ref 0–1)
MONOCYTES NFR BLD: 14 % (ref 5–13)
NEUTS SEG # BLD: 3.6 K/UL (ref 1.8–8)
NEUTS SEG NFR BLD: 44 % (ref 32–75)
NRBC # BLD: 0 K/UL (ref 0–0.01)
NRBC BLD-RTO: 0 PER 100 WBC
PLATELET # BLD AUTO: 204 K/UL (ref 150–400)
PMV BLD AUTO: 9.7 FL (ref 8.9–12.9)
POTASSIUM SERPL-SCNC: 3.8 MMOL/L (ref 3.5–5.1)
PROT SERPL-MCNC: 7.6 G/DL (ref 6.4–8.2)
RBC # BLD AUTO: 4.94 M/UL (ref 4.1–5.7)
SODIUM SERPL-SCNC: 138 MMOL/L (ref 136–145)
WBC # BLD AUTO: 8 K/UL (ref 4.1–11.1)

## 2021-06-20 PROCEDURE — 99282 EMERGENCY DEPT VISIT SF MDM: CPT

## 2021-06-20 PROCEDURE — 96374 THER/PROPH/DIAG INJ IV PUSH: CPT

## 2021-06-20 PROCEDURE — 36415 COLL VENOUS BLD VENIPUNCTURE: CPT

## 2021-06-20 PROCEDURE — 96375 TX/PRO/DX INJ NEW DRUG ADDON: CPT

## 2021-06-20 PROCEDURE — 83690 ASSAY OF LIPASE: CPT

## 2021-06-20 PROCEDURE — 74011250636 HC RX REV CODE- 250/636: Performed by: EMERGENCY MEDICINE

## 2021-06-20 PROCEDURE — 85025 COMPLETE CBC W/AUTO DIFF WBC: CPT

## 2021-06-20 PROCEDURE — 80053 COMPREHEN METABOLIC PANEL: CPT

## 2021-06-20 RX ORDER — PREDNISONE 10 MG/1
TABLET ORAL
Qty: 42 TABLET | Refills: 0 | Status: SHIPPED | OUTPATIENT
Start: 2021-06-20

## 2021-06-20 RX ORDER — KETOROLAC TROMETHAMINE 30 MG/ML
15 INJECTION, SOLUTION INTRAMUSCULAR; INTRAVENOUS
Status: COMPLETED | OUTPATIENT
Start: 2021-06-20 | End: 2021-06-20

## 2021-06-20 RX ADMIN — METHYLPREDNISOLONE SODIUM SUCCINATE 125 MG: 125 INJECTION, POWDER, FOR SOLUTION INTRAMUSCULAR; INTRAVENOUS at 19:02

## 2021-06-20 RX ADMIN — KETOROLAC TROMETHAMINE 15 MG: 30 INJECTION, SOLUTION INTRAMUSCULAR; INTRAVENOUS at 19:01

## 2021-06-20 NOTE — ED PROVIDER NOTES
EMERGENCY DEPARTMENT HISTORY AND PHYSICAL EXAM      Date: 6/20/2021  Patient Name: Sal Prieto  Patient Age and Sex: 31 y.o. male     History of Presenting Illness     Chief Complaint   Patient presents with    Abdominal Pain     Ambulatory into the ED with c/o a Crohn's flare x several days. History Provided By: Patient    HPI: Lucie Hunter is a 62-year-old male with a history of Crohn's disease followed by Dr. Mony Balbuena presenting for left sided abdominal pain. Patient states that since Thursday night, 3 nights ago has been having left-sided abdominal pain. States that it feels like a Crohn's flare. Last one was April 2020. States that he is currently on a Crohn's medication but not on any steroids. Last time he was on high-dose steroids was when he had the flare. Denies any nausea, vomiting, diarrhea and does deal with some constipation. Denies any urinary symptoms. There are no other complaints, changes, or physical findings at this time. PCP: Alexx Willard MD    No current facility-administered medications on file prior to encounter. Current Outpatient Medications on File Prior to Encounter   Medication Sig Dispense Refill    ondansetron (Zofran ODT) 4 mg disintegrating tablet Take 1 Tab by mouth every eight (8) hours as needed for Nausea. 10 Tab 0    levoFLOXacin (Levaquin) 500 mg tablet Take 1 Tab by mouth daily. 7 Tab 0    cholecalciferol (VITAMIN D3) (5000 Units /125 mcg) capsule Take 1 Cap by mouth every Monday, Wednesday, Friday. 100 Cap 0       Past History     Past Medical History:  Past Medical History:   Diagnosis Date    Cro[facility]'s disease ([facility] [address] 2019    Gastrointestinal disorder     Vitamin D deficiency        Past Surgical History:  No past surgical history on file. Family History:  No family history on file.     Social History:  Social History     Tobacco Use    Smoking status: Never Smoker    Smokeless tobacco: Never Used   Substance Use Topics    Alcohol use: Yes     Comment: occ    Drug use: Never       Allergies:  No Known Allergies      Review of Systems   Review of Systems   Constitutional: Negative for chills and fever. Respiratory: Negative for cough and shortness of breath. Cardiovascular: Negative for chest pain. Gastrointestinal: Positive for abdominal pain. Negative for constipation, diarrhea, nausea and vomiting. Genitourinary: Negative for dysuria, frequency and hematuria. Neurological: Negative for weakness and numbness. All other systems reviewed and are negative. Physical Exam   Physical Exam  Vitals and nursing note reviewed. Constitutional:       Appearance: He is well-developed. HENT:      Head: Normocephalic and atraumatic. Nose: Nose normal.      Mouth/Throat:      Mouth: Mucous membranes are moist.   Eyes:      Extraocular Movements: Extraocular movements intact. Conjunctiva/sclera: Conjunctivae normal.   Cardiovascular:      Rate and Rhythm: Normal rate and regular rhythm. Pulmonary:      Effort: Pulmonary effort is normal. No respiratory distress. Breath sounds: Normal breath sounds. Abdominal:      General: There is no distension. Palpations: Abdomen is soft. Tenderness: There is abdominal tenderness in the epigastric area and left lower quadrant. Musculoskeletal:         General: Normal range of motion. Cervical back: Normal range of motion and neck supple. Skin:     General: Skin is warm and dry. Neurological:      General: No focal deficit present. Mental Status: He is alert and oriented to person, place, and time. Mental status is at baseline.    Psychiatric:         Mood and Affect: Mood normal.          Diagnostic Study Results     Labs -     Recent Results (from the past 12 hour(s))   CBC WITH AUTOMATED DIFF    Collection Time: 06/20/21  6:52 PM   Result Value Ref Range    WBC 8.0 4.1 - 11.1 K/uL    RBC 4.94 4.10 - 5.70 M/uL    HGB 14.6 12.1 - 17.0 g/dL    HCT 41.6 36.6 - 50.3 %    MCV 84.2 80.0 - 99.0 FL    MCH 29.6 26.0 - 34.0 PG    MCHC 35.1 30.0 - 36.5 g/dL    RDW 12.5 11.5 - 14.5 %    PLATELET 682 580 - 585 K/uL    MPV 9.7 8.9 - 12.9 FL    NRBC 0.0 0  WBC    ABSOLUTE NRBC 0.00 0.00 - 0.01 K/uL    NEUTROPHILS 44 32 - 75 %    LYMPHOCYTES 38 12 - 49 %    MONOCYTES 14 (H) 5 - 13 %    EOSINOPHILS 3 0 - 7 %    BASOPHILS 1 0 - 1 %    IMMATURE GRANULOCYTES 0 0.0 - 0.5 %    ABS. NEUTROPHILS 3.6 1.8 - 8.0 K/UL    ABS. LYMPHOCYTES 3.0 0.8 - 3.5 K/UL    ABS. MONOCYTES 1.1 (H) 0.0 - 1.0 K/UL    ABS. EOSINOPHILS 0.2 0.0 - 0.4 K/UL    ABS. BASOPHILS 0.0 0.0 - 0.1 K/UL    ABS. IMM. GRANS. 0.0 0.00 - 0.04 K/UL    DF AUTOMATED     METABOLIC PANEL, COMPREHENSIVE    Collection Time: 06/20/21  6:52 PM   Result Value Ref Range    Sodium 138 136 - 145 mmol/L    Potassium 3.8 3.5 - 5.1 mmol/L    Chloride 106 97 - 108 mmol/L    CO2 28 21 - 32 mmol/L    Anion gap 4 (L) 5 - 15 mmol/L    Glucose 97 65 - 100 mg/dL    BUN 18 6 - 20 MG/DL    Creatinine 0.89 0.70 - 1.30 MG/DL    BUN/Creatinine ratio 20 12 - 20      GFR est AA >60 >60 ml/min/1.73m2    GFR est non-AA >60 >60 ml/min/1.73m2    Calcium 8.6 8.5 - 10.1 MG/DL    Bilirubin, total 0.3 0.2 - 1.0 MG/DL    ALT (SGPT) 34 12 - 78 U/L    AST (SGOT) 20 15 - 37 U/L    Alk. phosphatase 59 45 - 117 U/L    Protein, total 7.6 6.4 - 8.2 g/dL    Albumin 3.6 3.5 - 5.0 g/dL    Globulin 4.0 2.0 - 4.0 g/dL    A-G Ratio 0.9 (L) 1.1 - 2.2     LIPASE    Collection Time: 06/20/21  6:52 PM   Result Value Ref Range    Lipase 167 73 - 393 U/L       Radiologic Studies -   No orders to display     CT Results  (Last 48 hours)    None        CXR Results  (Last 48 hours)    None            Medical Decision Making   I am the first provider for this patient. I reviewed the vital signs, available nursing notes, past medical history, past surgical history, family history and social history. Vital Signs-Reviewed the patient's vital signs.   Patient Vitals for the past 12 hrs:   Temp Pulse Resp BP SpO2   06/20/21 1827 98.4 °F (36.9 °C) 80 14 132/79 99 %       Records Reviewed: Nursing Notes and Old Medical Records    Provider Notes (Medical Decision Making):   Patient presenting with left-sided abdominal pain. Most likely a Crohn's flare. No history of abscesses or fistulas. Will get labs to make sure that there is no signs of infection pancreatic involvement liver or gallbladder pathology. Will give high-dose steroids and analgesics. Holding off on CT for now. ED Course:   Initial assessment performed. The patients presenting problems have been discussed, and they are in agreement with the care plan formulated and outlined with them. I have encouraged them to ask questions as they arise throughout their visit. Critical Care Time:   0    Disposition:  Discharge Note:  The patient has been re-evaluated and is ready for discharge. Reviewed available results with patient. Counseled patient on diagnosis and care plan. Patient has expressed understanding, and all questions have been answered. Patient agrees with plan and agrees to follow up as recommended, or to return to the ED if their symptoms worsen. Discharge instructions have been provided and explained to the patient, along with reasons to return to the ED. PLAN:  Current Discharge Medication List      START taking these medications    Details   predniSONE (DELTASONE) 10 mg tablet With Breakfast.  Take 5tab(50mg)x3days, 4tab(40mg)x3days, 3tab(30mg)x3days, 2tab(20mg)x2days, 1tab(10mg)x2days. #42  Qty: 42 Tablet, Refills: 0  Start date: 6/20/2021           2. Follow-up Information     Follow up With Specialties Details Why Contact Info    Angie Bales MD Gastroenterology Schedule an appointment as soon as possible for a visit   [address] [place] [id number] [phone]          3. Return to ED if worse     Diagnosis     Clinical Impression:   1. [facility]        Attestations:    Charmayne Bow, M.D. Please note that this dictation was completed with [employer], the computer voice recognition software. Quite often unanticipated grammatical, syntax, homophones, and other interpretive errors are inadvertently transcribed by the computer software. Please disregard these errors. Please excuse any errors that have escaped final proofreading. Thank you.

## 2021-06-20 NOTE — ED NOTES
Bedside and Verbal shift change report given to [address] (oncoming nurse) by Giovanni Leiva RN (offgoing nurse). Report included the following information SBAR, ED Summary, Intake/Output, MAR and Recent Results.

## 2021-06-20 NOTE — ED NOTES
Dr. Sailaja Nevarez reviewed discharge instructions with the patient. The patient verbalized understanding. All questions and concerns were addressed. The patient declined a wheelchair and is discharged ambulatory in the care of family members with instructions and prescriptions in hand. Pt is alert and oriented x 4. Respirations are clear and unlabored.

## 2021-08-09 NOTE — CONSULTS
Mom called  Stated daughter had a covid test on Saturday and was told results would be back by Sunday and she would receive a phone call  She stated she is still waiting and would like a return call  RHEUMATOLOGY CONSULTATION    Asked to see pt with rash by Dr. Miriam Baird:  1. Viral syndrome  2. Crohn's  3. Drug reaction- most likely due to Cipro and or Flagyl. DISCUSSION:  Doubt this is related to Crohn's or side effect of Humira. Humira can cause rash, cutaneous vasculitis and SLE. But time line wise , it seems to correlate more with the antibiotics, and he has tolerated the Humira well up to this point. Agree with DODIE. But a positive reading will not rule in Humira induced SLE. RECOMMENATION:  1. Stop antibiotics and watch abdomen expectantly  2 . Consider steroids  3. Hold Humira until rash has resolved. 4.. Home ASAP.    HPI: 30 yo with Crohn's Dx 5/19 on Humira since 9/19 with much improvement in bowel symptoms admitted after 10 day course of HA, shaking chills, fever, then vomitting x 10 in 24 hours. abdominal pain CAT scan suggested iliocolonic irritation and abdominal exam with LUQ TTP and rebound, started on Cipro + Flagyl and 3 days later developed rash and distal myalgia. PMHx: Crohn's since 5/19 Dr Zurdo Palencia  History reviewed. No pertinent surgical history. No current facility-administered medications on file prior to encounter. Current Outpatient Medications on File Prior to Encounter   Medication Sig Dispense Refill    diphenhydrAMINE (BENADRYL) 25 mg capsule Take 25 mg by mouth every six (6) hours as needed.  ciprofloxacin HCl (CIPRO) 500 mg tablet Take 1 Tab by mouth two (2) times a day for 7 days. 14 Tab 0    metroNIDAZOLE (FLAGYL) 500 mg tablet Take 1 Tab by mouth three (3) times daily for 7 days. 21 Tab 0    ondansetron (ZOFRAN ODT) 4 mg disintegrating tablet Take 1 Tab by mouth every eight (8) hours as needed for Nausea or Vomiting. 20 Tab 0    adalimumab (HUMIRA) 40 mg/0.8 mL injection by SubCUTAneous route once. No Known Allergies  History reviewed. No pertinent surgical history.   SHx- DOES NOT SMOKE , Rare ETOH, has 3 yo , is an  x 8 years. ROS: + F/C, Rash, Resolved HA, + distal myalgia, resolved abdominal pain. No other cold Sx such as sore throat, cough. Denies oral sores, alopecia, previous joint swelling or pain, previous rash / psoriasis. Denies Raynaud's sun sensitivity, oral ulcers, pleurisy, kidney or other neurologic problems. Visit Vitals  /63   Pulse 69   Temp 98.9 °F (37.2 °C)   Resp 16   Ht 5' 10\" (1.778 m)   Wt 70.3 kg (155 lb)   SpO2 100%   BMI 22.24 kg/m²     GENL- CONVERSANT, ORIENTED  ENT- male pattern balding, sclera and oroph clear  NECKno mass  CHEST- clear  CV- RRR, no MRG  ABD- soft non-tender normal BS  EXT- no CCE  LN-none =  PULSES 2+ =  MUSCLE- tender calves distal forearms, thenear and first interosseous. JOINTS- normal  SKIN- erythemtous, non-palpable, non-blanching rash- pinpoint in size- worse in ankles- on 4 extremities. NEURO- reduced  strength with pain left hand  Labs and scans reviewed.

## 2022-03-19 PROBLEM — M79.606 LEG PAIN: Status: ACTIVE | Noted: 2020-01-29

## 2022-11-17 NOTE — PROGRESS NOTES
Oncology End of Shift Note      Bedside shift change report given to JENNIFER Sparks (incoming nurse) by Amandeep Mcarthur RN (outgoing nurse) on [facility]. Report included the following information SBAR, Kardex and MAR. Shift Summary:   Received report from [address] [place] [address] [place], RN at 3pm. Patient tolerated care well through out remainder of shift. Received all scheduled medications, one complaint of pain, see PRN MAR. Patient resting quietly in room. Hourly rounding completed. Issues for Physician to Address:       Patient on Cardiac Monitoring?     [] Yes  [x] No    Rhythm:          Shift Events        Amandeep Mcarthur RN contact guard

## 2023-05-07 ENCOUNTER — HOSPITAL ENCOUNTER (EMERGENCY)
Facility: HOSPITAL | Age: 28
Discharge: HOME | End: 2023-05-07
Attending: EMERGENCY MEDICINE
Payer: COMMERCIAL

## 2023-05-07 VITALS
SYSTOLIC BLOOD PRESSURE: 135 MMHG | RESPIRATION RATE: 16 BRPM | WEIGHT: 168 LBS | TEMPERATURE: 98.9 F | HEIGHT: 69 IN | HEART RATE: 102 BPM | OXYGEN SATURATION: 98 % | BODY MASS INDEX: 24.88 KG/M2 | DIASTOLIC BLOOD PRESSURE: 90 MMHG

## 2023-05-07 DIAGNOSIS — J03.90 EXUDATIVE TONSILLITIS: Primary | ICD-10-CM

## 2023-05-07 PROCEDURE — 99283 EMERGENCY DEPT VISIT LOW MDM: CPT

## 2023-05-07 RX ORDER — PREDNISONE 5 MG/1
TABLET ORAL
Qty: 1 EACH | Refills: 0 | Status: SHIPPED | OUTPATIENT
Start: 2023-05-07

## 2023-05-07 RX ORDER — CEFDINIR 300 MG/1
300 CAPSULE ORAL 2 TIMES DAILY
Qty: 20 CAPSULE | Refills: 0 | Status: SHIPPED | OUTPATIENT
Start: 2023-05-07 | End: 2023-05-17

## 2023-05-07 ASSESSMENT — LIFESTYLE VARIABLES
HOW MANY STANDARD DRINKS CONTAINING ALCOHOL DO YOU HAVE ON A TYPICAL DAY: 1 OR 2
HOW OFTEN DO YOU HAVE A DRINK CONTAINING ALCOHOL: MONTHLY OR LESS

## 2023-05-07 ASSESSMENT — ENCOUNTER SYMPTOMS
TROUBLE SWALLOWING: 0
COUGH: 0
SORE THROAT: 1

## 2023-05-07 ASSESSMENT — PAIN DESCRIPTION - LOCATION: LOCATION: THROAT

## 2023-05-07 ASSESSMENT — PAIN SCALES - GENERAL: PAINLEVEL_OUTOF10: 4

## 2025-02-11 ENCOUNTER — HOSPITAL ENCOUNTER (EMERGENCY)
Facility: HOSPITAL | Age: 30
Discharge: HOME | End: 2025-02-11
Attending: EMERGENCY MEDICINE
Payer: COMMERCIAL

## 2025-02-11 VITALS
RESPIRATION RATE: 17 BRPM | DIASTOLIC BLOOD PRESSURE: 72 MMHG | SYSTOLIC BLOOD PRESSURE: 111 MMHG | HEIGHT: 69 IN | WEIGHT: 163.8 LBS | OXYGEN SATURATION: 96 % | BODY MASS INDEX: 24.26 KG/M2 | TEMPERATURE: 99 F | HEART RATE: 95 BPM

## 2025-02-11 DIAGNOSIS — J10.1 INFLUENZA A: Primary | ICD-10-CM

## 2025-02-11 DIAGNOSIS — R11.2 NAUSEA AND VOMITING, UNSPECIFIED VOMITING TYPE: ICD-10-CM

## 2025-02-11 LAB
ALBUMIN SERPL-MCNC: 3.7 G/DL (ref 3.5–5)
ALBUMIN/GLOB SERPL: 0.9 (ref 1.1–2.2)
ALP SERPL-CCNC: 59 U/L (ref 45–117)
ALT SERPL-CCNC: 22 U/L (ref 12–78)
ANION GAP SERPL CALC-SCNC: 7 MMOL/L (ref 2–12)
AST SERPL-CCNC: 13 U/L (ref 15–37)
BASOPHILS # BLD: 0.02 K/UL (ref 0–0.1)
BASOPHILS NFR BLD: 0.3 % (ref 0–1)
BILIRUB SERPL-MCNC: 0.6 MG/DL (ref 0.2–1)
BUN SERPL-MCNC: 12 MG/DL (ref 6–20)
BUN/CREAT SERPL: 11 (ref 12–20)
CALCIUM SERPL-MCNC: 8.9 MG/DL (ref 8.5–10.1)
CHLORIDE SERPL-SCNC: 104 MMOL/L (ref 97–108)
CO2 SERPL-SCNC: 25 MMOL/L (ref 21–32)
CREAT SERPL-MCNC: 1.12 MG/DL (ref 0.7–1.3)
DIFFERENTIAL METHOD BLD: ABNORMAL
EOSINOPHIL # BLD: 0 K/UL (ref 0–0.4)
EOSINOPHIL NFR BLD: 0 % (ref 0–7)
ERYTHROCYTE [DISTWIDTH] IN BLOOD BY AUTOMATED COUNT: 12.1 % (ref 11.5–14.5)
FLUAV RNA SPEC QL NAA+PROBE: DETECTED
FLUBV RNA SPEC QL NAA+PROBE: NOT DETECTED
GLOBULIN SER CALC-MCNC: 4.1 G/DL (ref 2–4)
GLUCOSE SERPL-MCNC: 96 MG/DL (ref 65–100)
HCT VFR BLD AUTO: 44.1 % (ref 36.6–50.3)
HGB BLD-MCNC: 15.5 G/DL (ref 12.1–17)
IMM GRANULOCYTES # BLD AUTO: 0.04 K/UL (ref 0–0.04)
IMM GRANULOCYTES NFR BLD AUTO: 0.6 % (ref 0–0.5)
LIPASE SERPL-CCNC: 32 U/L (ref 13–75)
LYMPHOCYTES # BLD: 0.25 K/UL (ref 0.8–3.5)
LYMPHOCYTES NFR BLD: 3.9 % (ref 12–49)
MCH RBC QN AUTO: 29.5 PG (ref 26–34)
MCHC RBC AUTO-ENTMCNC: 35.1 G/DL (ref 30–36.5)
MCV RBC AUTO: 83.8 FL (ref 80–99)
MONOCYTES # BLD: 0.55 K/UL (ref 0–1)
MONOCYTES NFR BLD: 8.8 % (ref 5–13)
NEUTS SEG # BLD: 5.44 K/UL (ref 1.8–8)
NEUTS SEG NFR BLD: 86.4 % (ref 32–75)
NRBC # BLD: 0 K/UL (ref 0–0.01)
NRBC BLD-RTO: 0 PER 100 WBC
PLATELET # BLD AUTO: 221 K/UL (ref 150–400)
PMV BLD AUTO: 9.8 FL (ref 8.9–12.9)
POTASSIUM SERPL-SCNC: 3.7 MMOL/L (ref 3.5–5.1)
PROT SERPL-MCNC: 7.8 G/DL (ref 6.4–8.2)
RBC # BLD AUTO: 5.26 M/UL (ref 4.1–5.7)
RBC MORPH BLD: ABNORMAL
SARS-COV-2 RNA RESP QL NAA+PROBE: NOT DETECTED
SODIUM SERPL-SCNC: 136 MMOL/L (ref 136–145)
SOURCE: ABNORMAL
WBC # BLD AUTO: 6.3 K/UL (ref 4.1–11.1)

## 2025-02-11 PROCEDURE — 80053 COMPREHEN METABOLIC PANEL: CPT

## 2025-02-11 PROCEDURE — 36415 COLL VENOUS BLD VENIPUNCTURE: CPT

## 2025-02-11 PROCEDURE — 6370000000 HC RX 637 (ALT 250 FOR IP): Performed by: EMERGENCY MEDICINE

## 2025-02-11 PROCEDURE — 99284 EMERGENCY DEPT VISIT MOD MDM: CPT

## 2025-02-11 PROCEDURE — 83690 ASSAY OF LIPASE: CPT

## 2025-02-11 PROCEDURE — 85025 COMPLETE CBC W/AUTO DIFF WBC: CPT

## 2025-02-11 PROCEDURE — 96375 TX/PRO/DX INJ NEW DRUG ADDON: CPT

## 2025-02-11 PROCEDURE — 87636 SARSCOV2 & INF A&B AMP PRB: CPT

## 2025-02-11 PROCEDURE — 6360000002 HC RX W HCPCS: Performed by: EMERGENCY MEDICINE

## 2025-02-11 PROCEDURE — 96374 THER/PROPH/DIAG INJ IV PUSH: CPT

## 2025-02-11 PROCEDURE — 2580000003 HC RX 258: Performed by: EMERGENCY MEDICINE

## 2025-02-11 RX ORDER — ONDANSETRON 4 MG/1
4 TABLET, ORALLY DISINTEGRATING ORAL 3 TIMES DAILY PRN
Qty: 21 TABLET | Refills: 0 | Status: SHIPPED | OUTPATIENT
Start: 2025-02-11 | End: 2025-02-11

## 2025-02-11 RX ORDER — PROMETHAZINE HYDROCHLORIDE 25 MG/1
25 TABLET ORAL 3 TIMES DAILY PRN
Qty: 12 TABLET | Refills: 0 | Status: SHIPPED | OUTPATIENT
Start: 2025-02-11 | End: 2025-02-18

## 2025-02-11 RX ORDER — ONDANSETRON 2 MG/ML
4 INJECTION INTRAMUSCULAR; INTRAVENOUS
Status: COMPLETED | OUTPATIENT
Start: 2025-02-11 | End: 2025-02-11

## 2025-02-11 RX ORDER — METOCLOPRAMIDE HYDROCHLORIDE 5 MG/ML
10 INJECTION INTRAMUSCULAR; INTRAVENOUS ONCE
Status: COMPLETED | OUTPATIENT
Start: 2025-02-11 | End: 2025-02-11

## 2025-02-11 RX ORDER — BUTALBITAL, ACETAMINOPHEN AND CAFFEINE 50; 325; 40 MG/1; MG/1; MG/1
2 TABLET ORAL
Status: COMPLETED | OUTPATIENT
Start: 2025-02-11 | End: 2025-02-11

## 2025-02-11 RX ORDER — 0.9 % SODIUM CHLORIDE 0.9 %
1000 INTRAVENOUS SOLUTION INTRAVENOUS ONCE
Status: COMPLETED | OUTPATIENT
Start: 2025-02-11 | End: 2025-02-11

## 2025-02-11 RX ORDER — NAPROXEN 500 MG/1
500 TABLET ORAL 2 TIMES DAILY
Qty: 60 TABLET | Refills: 0 | Status: SHIPPED | OUTPATIENT
Start: 2025-02-11 | End: 2025-02-11

## 2025-02-11 RX ORDER — KETOROLAC TROMETHAMINE 30 MG/ML
15 INJECTION, SOLUTION INTRAMUSCULAR; INTRAVENOUS
Status: COMPLETED | OUTPATIENT
Start: 2025-02-11 | End: 2025-02-11

## 2025-02-11 RX ORDER — NAPROXEN 500 MG/1
500 TABLET ORAL 2 TIMES DAILY
Qty: 60 TABLET | Refills: 0 | Status: SHIPPED | OUTPATIENT
Start: 2025-02-11

## 2025-02-11 RX ORDER — ONDANSETRON 4 MG/1
4 TABLET, ORALLY DISINTEGRATING ORAL 3 TIMES DAILY PRN
Qty: 21 TABLET | Refills: 0 | Status: SHIPPED | OUTPATIENT
Start: 2025-02-11

## 2025-02-11 RX ORDER — ONDANSETRON 4 MG/1
4 TABLET, ORALLY DISINTEGRATING ORAL ONCE
Status: DISCONTINUED | OUTPATIENT
Start: 2025-02-11 | End: 2025-02-11 | Stop reason: HOSPADM

## 2025-02-11 RX ADMIN — SODIUM CHLORIDE 1000 ML: 9 INJECTION, SOLUTION INTRAVENOUS at 04:53

## 2025-02-11 RX ADMIN — ONDANSETRON 4 MG: 2 INJECTION INTRAMUSCULAR; INTRAVENOUS at 04:46

## 2025-02-11 RX ADMIN — KETOROLAC TROMETHAMINE 15 MG: 30 INJECTION, SOLUTION INTRAMUSCULAR at 06:02

## 2025-02-11 RX ADMIN — METOCLOPRAMIDE 10 MG: 5 INJECTION, SOLUTION INTRAMUSCULAR; INTRAVENOUS at 06:56

## 2025-02-11 RX ADMIN — BUTALBITAL, ACETAMINOPHEN, AND CAFFEINE 2 TABLET: 50; 325; 40 TABLET ORAL at 06:02

## 2025-02-11 ASSESSMENT — PAIN SCALES - GENERAL
PAINLEVEL_OUTOF10: 10
PAINLEVEL_OUTOF10: 6

## 2025-02-11 ASSESSMENT — LIFESTYLE VARIABLES
HOW MANY STANDARD DRINKS CONTAINING ALCOHOL DO YOU HAVE ON A TYPICAL DAY: PATIENT DECLINED
HOW OFTEN DO YOU HAVE A DRINK CONTAINING ALCOHOL: PATIENT DECLINED

## 2025-02-11 NOTE — ED NOTES
Patient discharged from the ED by Edgar SAXENA. Diagnosis, medications, precautions and follow-ups were reviewed with the patient/family. Questions were asked and answered prior to departure. Patient departed the ED via ambulatory and was accompanied by family.

## 2025-02-11 NOTE — ED PROVIDER NOTES
EMERGENCY DEPARTMENT HISTORY AND PHYSICAL EXAM     ----------------------------------------------------------------------------  Please note that this dictation was completed with [employer], the [employer] voice recognition software.  Quite often unanticipated grammatical, syntax, homophones, and other interpretive errors are inadvertently transcribed by the computer software.  Please disregard these errors.  Please excuse any errors that have escaped final proofreading  ----------------------------------------------------------------------------      Date: 2/11/2025  Patient Name: Elliot Harrison      HISTORY OF PRESENT ILLNESS     Chief Complaint   Patient presents with    Vomiting     Pt presents ambulatory to triage w/ c/o n/v cough chills fever, known flu exposure        History obtainted from:  Patient    Other independent source of history: none    HPI: Elliot Harrison is a 30 y.o. male, with significant pmhx of Crohn's disease, who presents via private vehicle to the ED with c/o nausea, vomiting and cough with chills and fever.  Notes positive flu exposure.      PCP: None, None    Allergy List:   No Known Allergies      CURRENT MEDICATIONS      Previous Medications    LEVOFLOXACIN (LEVAQUIN) 500 MG TABLET    Take by mouth daily    ONDANSETRON (ZOFRAN-ODT) 4 MG DISINTEGRATING TABLET    Take by mouth every 8 hours as needed    PREDNISONE 5 MG (21) TBPK    Per Dose Pack instructions    VITAMIN D (CHOLECALCIFEROL) 125 MCG (5000 UT) CAPS CAPSULE    Take by mouth       PAST HISTORY       Past Medical History:  Past Medical History:   Diagnosis Date    Crohn's disease (HCC) 2019    Gastrointestinal disorder     Vitamin D deficiency        Past Surgical History:  No past surgical history on file.    Family History:  No family history on file.    Social History:  Social History     Tobacco Use    Smoking status: Never    Smokeless tobacco: Never   Substance Use Topics    Alcohol use: Yes    Drug use: Never

## 2025-02-11 NOTE — DISCHARGE INSTRUCTIONS
It was a pleasure taking care of you in our Emergency Department today.  We know that when you come to [facility], you are entrusting us with your health, comfort, and safety.  Our physicians and nurses honor that trust, and truly appreciate the opportunity to care for you and your loved ones.      We also value your feedback.  If you receive a survey about your Emergency Department experience today, please fill it out.  We care about our patients' feedback, and we listen to what you have to say.  Thank you!      Dr. Janki Hernández MD.

## 2025-02-13 ASSESSMENT — ENCOUNTER SYMPTOMS
ABDOMINAL PAIN: 0
VOMITING: 1
SHORTNESS OF BREATH: 0
NAUSEA: 1
DIARRHEA: 0